# Patient Record
Sex: FEMALE | Race: WHITE | NOT HISPANIC OR LATINO | Employment: PART TIME | URBAN - METROPOLITAN AREA
[De-identification: names, ages, dates, MRNs, and addresses within clinical notes are randomized per-mention and may not be internally consistent; named-entity substitution may affect disease eponyms.]

---

## 2017-07-13 ENCOUNTER — THERAPY VISIT (OUTPATIENT)
Dept: PHYSICAL THERAPY | Facility: CLINIC | Age: 52
End: 2017-07-13
Payer: COMMERCIAL

## 2017-07-13 DIAGNOSIS — M25.561 RIGHT KNEE PAIN, UNSPECIFIED CHRONICITY: Primary | ICD-10-CM

## 2017-07-13 DIAGNOSIS — M25.551 HIP PAIN, RIGHT: ICD-10-CM

## 2017-07-13 PROCEDURE — 97110 THERAPEUTIC EXERCISES: CPT | Mod: GP | Performed by: PHYSICAL THERAPIST

## 2017-07-13 PROCEDURE — 97161 PT EVAL LOW COMPLEX 20 MIN: CPT | Mod: GP | Performed by: PHYSICAL THERAPIST

## 2017-07-13 ASSESSMENT — ACTIVITIES OF DAILY LIVING (ADL)
HOS_ADL_HIGHEST_POTENTIAL_SCORE: 68
KNEEL ON THE FRONT OF YOUR KNEE: ACTIVITY IS NOT DIFFICULT
RISE FROM A CHAIR: ACTIVITY IS NOT DIFFICULT
STAND: ACTIVITY IS NOT DIFFICULT
HOS_ADL_COUNT: 17
ROLLING_OVER_IN_BED: NO DIFFICULTY AT ALL
PUTTING_ON_SOCKS_AND_SHOES: NO DIFFICULTY AT ALL
TWISTING/PIVOTING_ON_INVOLVED_LEG: NO DIFFICULTY AT ALL
DEEP_SQUATTING: SLIGHT DIFFICULTY
WALK: ACTIVITY IS NOT DIFFICULT
STEPPING_UP_AND_DOWN_CURBS: NO DIFFICULTY AT ALL
KNEE_ACTIVITY_OF_DAILY_LIVING_SCORE: 84.29
HOW_WOULD_YOU_RATE_THE_CURRENT_FUNCTION_OF_YOUR_KNEE_DURING_YOUR_USUAL_DAILY_ACTIVITIES_ON_A_SCALE_FROM_0_TO_100_WITH_100_BEING_YOUR_LEVEL_OF_KNEE_FUNCTION_PRIOR_TO_YOUR_INJURY_AND_0_BEING_THE_INABILITY_TO_PERFORM_ANY_OF_YOUR_USUAL_DAILY_ACTIVITIES?: 85
WALKING_INITIALLY: NO DIFFICULTY AT ALL
STIFFNESS: THE SYMPTOM AFFECTS MY ACTIVITY SLIGHTLY
HEAVY_WORK: NO DIFFICULTY AT ALL
PAIN: THE SYMPTOM AFFECTS MY ACTIVITY MODERATELY
HOS_ADL_SCORE(%): 92.65
AS_A_RESULT_OF_YOUR_KNEE_INJURY,_HOW_WOULD_YOU_RATE_YOUR_CURRENT_LEVEL_OF_DAILY_ACTIVITY?: NEARLY NORMAL
STANDING_FOR_15_MINUTES: NO DIFFICULTY AT ALL
SITTING_FOR_15_MINUTES: MODERATE DIFFICULTY
LIGHT_TO_MODERATE_WORK: NO DIFFICULTY AT ALL
WALKING_15_MINUTES_OR_GREATER: NO DIFFICULTY AT ALL
WALKING_DOWN_STEEP_HILLS: SLIGHT DIFFICULTY
LIMPING: I DO NOT HAVE THE SYMPTOM
HOS_ADL_ITEM_SCORE_TOTAL: 63
SQUAT: ACTIVITY IS MINIMALLY DIFFICULT
SWELLING: THE SYMPTOM AFFECTS MY ACTIVITY MODERATELY
SIT WITH YOUR KNEE BENT: ACTIVITY IS NOT DIFFICULT
RAW_SCORE: 59
WEAKNESS: THE SYMPTOM AFFECTS MY ACTIVITY SLIGHTLY
HOW_WOULD_YOU_RATE_THE_OVERALL_FUNCTION_OF_YOUR_KNEE_DURING_YOUR_USUAL_DAILY_ACTIVITIES?: NEARLY NORMAL
WALKING_UP_STEEP_HILLS: SLIGHT DIFFICULTY
GO UP STAIRS: ACTIVITY IS NOT DIFFICULT
GETTING_INTO_AND_OUT_OF_AN_AVERAGE_CAR: SLIGHT DIFFICULTY
RECREATIONAL_ACTIVITIES: SLIGHT DIFFICULTY
GETTING_INTO_AND_OUT_OF_A_BATHTUB: NO DIFFICULTY AT ALL
KNEE_ACTIVITY_OF_DAILY_LIVING_SUM: 59
HOW_WOULD_YOU_RATE_YOUR_CURRENT_LEVEL_OF_FUNCTION_DURING_YOUR_USUAL_ACTIVITIES_OF_DAILY_LIVING_FROM_0_TO_100_WITH_100_BEING_YOUR_LEVEL_OF_FUNCTION_PRIOR_TO_YOUR_HIP_PROBLEM_AND_0_BEING_THE_INABILITY_TO_PERFORM_ANY_OF_YOUR_USUAL_DAILY_ACTIVITIES?: 80
GO DOWN STAIRS: ACTIVITY IS NOT DIFFICULT
GOING_DOWN_1_FLIGHT_OF_STAIRS: NO DIFFICULTY AT ALL
GOING_UP_1_FLIGHT_OF_STAIRS: NO DIFFICULTY AT ALL
GIVING WAY, BUCKLING OR SHIFTING OF KNEE: I DO NOT HAVE THE SYMPTOM
WALKING_APPROXIMATELY_10_MINUTES: NO DIFFICULTY AT ALL

## 2017-07-13 NOTE — MR AVS SNAPSHOT
"              After Visit Summary   7/13/2017    Nori Lazaro    MRN: [id number]           Patient Information     Date Of Birth          1965        Visit Information        Provider Department      7/13/2017 4:40 PM Aniya Saavedra PT [facility]        Today's Diagnoses     Right knee pain, unspecified chronicity    -  1    Hip pain, right           Follow-ups after your visit        Your next 10 appointments already scheduled     Jul 20, 2017 12:40 PM CDT   RANI Extremity with Aniya Saavedra PT   [facility] (R[facility] Nettie  )    [address]  [facility] [address] [phone]              Who to contact     If you have questions or need follow up information about today's clinic visit or your schedule please contact [facility] directly at [phone].  Normal or non-critical lab and imaging results will be communicated to you by [employer], letter or phone within 4 business days after the clinic has received the results. If you do not hear from us within 7 days, please contact the clinic through [employer] or phone. If you have a critical or abnormal lab result, we will notify you by phone as soon as possible.  Submit refill requests through [employer] or call your pharmacy and they will forward the refill request to us. Please allow 3 business days for your refill to be completed.          Additional Information About Your Visit        [employer] Information     [employer] lets you send messages to your doctor, view your test results, renew your prescriptions, schedule appointments and more. To sign up, go to [email or web address] . Click on \"Log in\" on the left side of the screen, which will take you to the Welcome page. Then click on \"Sign up Now\" on the right side of the page.     You will be asked to enter the access code listed below, as well as some " personal information. Please follow the directions to create your username and password.     Your access code is: [email or web address]  Expires: 10/11/2017  5:24 PM     Your access code will  in 90 days. If you need help or a new code, please call your [place] clinic or [phone].        Care EveryWhere ID     This is your Care EveryWhere ID. This could be used by other organizations to access your [place] medical records  [id number]         Blood Pressure from Last 3 Encounters:   14 114/76   14 120/78   12 128/86    Weight from Last 3 Encounters:   14 62.6 kg (138 lb)   12 61.7 kg (136 lb)   12 61.7 kg (136 lb)              We Performed the Following     HC PT EVAL, LOW COMPLEXITY     RANI INITIAL EVAL REPORT     THERAPEUTIC EXERCISES        Primary Care Provider Office Phone # Fax #    Angela [facility] [phone]       [facility] [address] [facility] [address]  [facility] [address]        Equal Access to Services     [facility]NA [facility] : Hadii aad ku hadasho Somarilee, waaxda luqadaha, qaybta kaalmada barrett, sammy joyce . So [facility] [phone].    ATENCIÓN: Si habla español, tiene a uriarte disposición servicios gratuitos de asistencia lingüística. [facility] [phone].    We comply with applicable federal civil rights laws and [place] laws. We do not discriminate on the basis of race, color, national origin, age, disability sex, sexual orientation or gender identity.            Thank you!     Thank you for choosing [facility]  for your care. Our goal is always to provide you with excellent care. Hearing back from our patients is one way we can continue to improve our services. Please take a few minutes to complete the written survey that you may receive in the mail after your visit with us. Thank you!             Your Updated Medication List - Protect others around you: Learn  how to safely use, store and throw away your medicines at www.disposemymeds.org.          This list is accurate as of: 7/13/17  5:24 PM.  Always use your most recent med list.                   Brand Name Dispense Instructions for use Diagnosis    ibuprofen 600 MG tablet    ADVIL/MOTRIN    30 tablet    Take 1 tablet (600 mg) by mouth every 6 hours as needed for pain (mild)    Acute post-operative pain       NONFORMULARY      Multiple supplements

## 2017-07-13 NOTE — PROGRESS NOTES
Subjective:    Patient is a 52 year old female presenting with rehab left ankle/foot hpi.                                      Pertinent medical history includes:  Menopausal and cancer.  Medical allergies: no.  Other surgeries include:  Cancer surgery and other.  Current medications:  None as reported by patient.  Current occupation is  teacher .  Patient is working in normal job without restrictions.  Primary job tasks include:  Prolonged sitting.    Barriers include:  None as reported by patient.    Red flags:  Severe dizziness and pain at rest/night.           Knee Activity of Daily Living Score: 84.29            Objective:    System    Physical Exam    General     ROS    Assessment/Plan:

## 2017-07-13 NOTE — PROGRESS NOTES
Subjective:  Physical Therapy Initial Evaluation   17   Precautions/Restrictions/MD instructions: PT eval and treat   Therapist Impression:   Pt is a 53 y/o female, with 8 month history of right knee and right hip/buttock pain (R leg pain), but signs and symptoms consistent with R sided lumbar radiculopathy. Pt presents with pain, decreased ROM/mobility, and decreased strength. These impairments limit their ability to sitting prolonged periods of time (>10 min). Skilled PT services necessary in order to reduce impairments and improve independent function.    Subjective:   Chief Complaint: Right knee and right hip/buttock pain. Worse with sitting prolonged periods of time.   DOI/onset: 10/15/16 DOS: none  Location: R buttock/hip/knee  Quality: sharp/shooting  Frequency: intermittent Radiates: intermittent down past R knee  Pain scale: Rest 5/10 Activity 10    Sleepin-2X/night wakes due to pain    Exacerbated by: sitting  Relieved by: chiro/massage  Progression: worse   Previous Treatment: chiro Effect of prior treatment: good   PMH and/or surgical history: L hamstring reconstruction; DNC; basal skin CA   Imaging: none    Occupation: /teacher  Job duties: sitting/standing    Current HEP/exercise regimen: running; biking and swimming  Patient's goals: get back to running and sit without pain  Medications: none to note  General health as reported by patient: good   Return to MD: as needed.   Red Flags: basel cell CA     HPI                    Objective:  HIP:    Lumbar Screen: extension and flexion full (painful with flexion and better with extension)  ++ slump on right     PROM: (* indicates patient's pain)   L  R   Flexion 120 120   Extension 20 20   Abduction 45 45   IR (supine 90-90) 45 45   ER (supine 90-90) 50 50     Strength:   L R   HIP     Flex 4/5 4/5   Ext 4/5 4/5   Add (0-45-90 degrees: supine) nt  nt   Abd 4-/5 3+/5     Special tests:   L R   JASON - +   SLR - +   Log Roll - -        Palpation: tender to low back - PA's to L4-5      System    Physical Exam    General     ROS    Assessment/Plan:      Patient is a 52 year old female with right side hip and right side knee complaints.    Patient has the following significant findings with corresponding treatment plan.                Diagnosis 1:  Right knee pain, and right hip pain  Pain -  hot/cold therapy, manual therapy, self management, education, directional preference exercise and home program  Decreased ROM/flexibility - manual therapy and therapeutic exercise  Decreased joint mobility - manual therapy and therapeutic exercise  Decreased strength - therapeutic exercise and therapeutic activities  Impaired balance - neuro re-education and therapeutic activities  Decreased proprioception - neuro re-education and therapeutic activities  Inflammation - cold therapy and self management/home program  Impaired gait - gait training  Impaired muscle performance - neuro re-education  Decreased function - therapeutic activities    Therapy Evaluation Codes:   1) History comprised of:   Personal factors that impact the plan of care:      None.    Comorbidity factors that impact the plan of care are:      None.     Medications impacting care: None.  2) Examination of Body Systems comprised of:   Body structures and functions that impact the plan of care:      Hip and Knee.   Activity limitations that impact the plan of care are:      Sitting.  3) Clinical presentation characteristics are:   Stable/Uncomplicated.  4) Decision-Making    Low complexity using standardized patient assessment instrument and/or measureable assessment of functional outcome.  Cumulative Therapy Evaluation is: Low complexity.    Previous and current functional limitations:  (See Goal Flow Sheet for this information)    Short term and Long term goals: (See Goal Flow Sheet for this information)     Communication ability:  Patient appears to be able to clearly communicate and  understand verbal and written communication and follow directions correctly.  Treatment Explanation - The following has been discussed with the patient:   RX ordered/plan of care  Anticipated outcomes  Possible risks and side effects  This patient would benefit from PT intervention to resume normal activities.   Rehab potential is excellent.    Frequency:  1 X week, once daily  Duration:  for 8 weeks  Discharge Plan:  Achieve all LTG.  Independent in home treatment program.  Reach maximal therapeutic benefit.      Please refer to the daily flowsheet for treatment today, total treatment time and time spent performing 1:1 timed codes.

## 2017-07-13 NOTE — LETTER
[facility]  [address]  [place] [address]  [phone]    2017    Re: Nori Lazaro   :   1965  MRN:  [id number]   REFERRING PHYSICIAN:   Kerline Raphael     [facility]  Date of Initial Evaluation:  2017  Visits:  1 Rxs Used: 1  Reason for Referral:     Right knee pain, unspecified chronicity  Hip pain, right  EVALUATION SUMMARY  Subjective:  Physical Therapy Initial Evaluation   17   Precautions/Restrictions/MD instructions: PT eval and treat   Therapist Impression:   Pt is a 51 y/o female, with 8 month history of right knee and right hip/buttock pain (R leg pain), but signs and symptoms consistent with R sided lumbar radiculopathy. Pt presents with pain, decreased ROM/mobility, and decreased strength. These impairments limit their ability to sitting prolonged periods of time (>10 min). Skilled PT services necessary in order to reduce impairments and improve independent function.  Subjective:   Chief Complaint: Right knee and right hip/buttock pain. Worse with sitting prolonged periods of time.   DOI/onset: 10/15/16 DOS: none  Location: R buttock/hip/knee  Quality: sharp/shooting  Frequency: intermittent Radiates: intermittent down past R knee  Pain scale: Rest 5/10 Activity 6/10    Sleepin-2X/night wakes due to pain    Exacerbated by: sitting  Relieved by: chiro/massage  Progression: worse   Previous Treatment: chiro Effect of prior treatment: good   PMH and/or surgical history: L hamstring reconstruction; DNC; basal skin CA   Imaging: none    Occupation: /teacher  Job duties: sitting/standing    Current HEP/exercise regimen: running; biking and swimming  Patient's goals: get   Re: Nori Lazaro   :   1965  back to running and sit without pain  Medications: none to note  General health as reported by patient: good   Return to MD: as  needed.   Red Flags: basel cell CA   Objective:  HIP:  Lumbar Screen: extension and flexion full (painful with flexion and better with extension)  ++ slump on right   PROM: (* indicates patient's pain)   L  R   Flexion 120 120   Extension 20 20   Abduction 45 45   IR (supine 90-90) 45 45   ER (supine 90-90) 50 50   Strength:   L R   HIP     Flex 4/5 4/5   Ext 4/5 4/5   Add (0-45-90 degrees: supine) nt  nt   Abd 4-/5 3+/5   Special tests:   L R   JASON - +   SLR - +   Log Roll - -   Palpation: tender to low back - PA's to L4-5  Assessment/Plan:    Patient is a 52 year old female with right side hip and right side knee complaints.    Patient has the following significant findings with corresponding treatment plan.                Diagnosis 1:  Right knee pain, and right hip pain  Pain -  hot/cold therapy, manual therapy, self management, education, directional preference exercise and home program  Decreased ROM/flexibility - manual therapy and therapeutic exercise  Decreased joint mobility - manual therapy and therapeutic exercise  Decreased strength - therapeutic exercise and therapeutic activities  Impaired balance - neuro re-education and therapeutic activities  Decreased proprioception - neuro re-education and therapeutic activities  Inflammation - cold therapy and self management/home program  Impaired gait - gait training  Impaired muscle performance - neuro re-education  Decreased function - therapeutic activities  Therapy Evaluation Codes:   1) History comprised of:  Re: Nori Lazaro   :   1965   Personal factors that impact the plan of care:      None.    Comorbidity factors that impact the plan of care are:      None.     Medications impacting care: None.  2) Examination of Body Systems comprised of:   Body structures and functions that impact the plan of care:      Hip and Knee.   Activity limitations that impact the plan of care are:      Sitting.  3) Clinical presentation characteristics  are:   Stable/Uncomplicated.  4) Decision-Making    Low complexity using standardized patient assessment instrument and/or measureable assessment of functional outcome.  Cumulative Therapy Evaluation is: Low complexity.  Previous and current functional limitations:  (See Goal Flow Sheet for this information)    Short term and Long term goals: (See Goal Flow Sheet for this information)   Communication ability:  Patient appears to be able to clearly communicate and understand verbal and written communication and follow directions correctly.  Treatment Explanation - The following has been discussed with the patient:   RX ordered/plan of care  Anticipated outcomes  Possible risks and side effects  This patient would benefit from PT intervention to resume normal activities.   Rehab potential is excellent.  Frequency:  1 X week, once daily  Duration:  for 8 weeks  Discharge Plan:  Achieve all LTG.  Independent in home treatment program.  Reach maximal therapeutic benefit.    Thank you for your referral.    INQUIRIES  Therapist: Aniya Saavedra DPT  [facility]  [address] [place], [place] [address] [address]  Phone: [phone]  Fax: [phone]

## 2017-07-20 ENCOUNTER — THERAPY VISIT (OUTPATIENT)
Dept: PHYSICAL THERAPY | Facility: CLINIC | Age: 52
End: 2017-07-20
Payer: COMMERCIAL

## 2017-07-20 DIAGNOSIS — M25.561 RIGHT KNEE PAIN, UNSPECIFIED CHRONICITY: ICD-10-CM

## 2017-07-20 DIAGNOSIS — M25.551 HIP PAIN, RIGHT: ICD-10-CM

## 2017-07-20 PROCEDURE — 97112 NEUROMUSCULAR REEDUCATION: CPT | Mod: GP | Performed by: PHYSICAL THERAPIST

## 2017-07-20 PROCEDURE — 97140 MANUAL THERAPY 1/> REGIONS: CPT | Mod: GP | Performed by: PHYSICAL THERAPIST

## 2017-07-31 ENCOUNTER — THERAPY VISIT (OUTPATIENT)
Dept: PHYSICAL THERAPY | Facility: CLINIC | Age: 52
End: 2017-07-31
Payer: COMMERCIAL

## 2017-07-31 DIAGNOSIS — M25.551 HIP PAIN, RIGHT: ICD-10-CM

## 2017-07-31 DIAGNOSIS — M25.561 RIGHT KNEE PAIN, UNSPECIFIED CHRONICITY: ICD-10-CM

## 2017-07-31 PROCEDURE — 97112 NEUROMUSCULAR REEDUCATION: CPT | Mod: GP | Performed by: PHYSICAL THERAPIST

## 2017-07-31 PROCEDURE — 97140 MANUAL THERAPY 1/> REGIONS: CPT | Mod: GP | Performed by: PHYSICAL THERAPIST

## 2017-08-14 ENCOUNTER — THERAPY VISIT (OUTPATIENT)
Dept: PHYSICAL THERAPY | Facility: CLINIC | Age: 52
End: 2017-08-14
Payer: COMMERCIAL

## 2017-08-14 DIAGNOSIS — M25.561 RIGHT KNEE PAIN, UNSPECIFIED CHRONICITY: ICD-10-CM

## 2017-08-14 DIAGNOSIS — M25.551 HIP PAIN, RIGHT: ICD-10-CM

## 2017-08-14 PROCEDURE — 97140 MANUAL THERAPY 1/> REGIONS: CPT | Mod: GP | Performed by: PHYSICAL THERAPIST

## 2017-08-14 PROCEDURE — 97110 THERAPEUTIC EXERCISES: CPT | Mod: GP | Performed by: PHYSICAL THERAPIST

## 2017-08-31 ENCOUNTER — THERAPY VISIT (OUTPATIENT)
Dept: PHYSICAL THERAPY | Facility: CLINIC | Age: 52
End: 2017-08-31
Payer: COMMERCIAL

## 2017-08-31 DIAGNOSIS — M25.551 HIP PAIN, RIGHT: ICD-10-CM

## 2017-08-31 DIAGNOSIS — M25.561 RIGHT KNEE PAIN, UNSPECIFIED CHRONICITY: ICD-10-CM

## 2017-08-31 PROCEDURE — 97110 THERAPEUTIC EXERCISES: CPT | Mod: GP | Performed by: PHYSICAL THERAPIST

## 2017-08-31 PROCEDURE — 97140 MANUAL THERAPY 1/> REGIONS: CPT | Mod: GP | Performed by: PHYSICAL THERAPIST

## 2017-09-15 ENCOUNTER — THERAPY VISIT (OUTPATIENT)
Dept: PHYSICAL THERAPY | Facility: CLINIC | Age: 52
End: 2017-09-15
Payer: COMMERCIAL

## 2017-09-15 DIAGNOSIS — M25.561 RIGHT KNEE PAIN, UNSPECIFIED CHRONICITY: ICD-10-CM

## 2017-09-15 DIAGNOSIS — M25.551 HIP PAIN, RIGHT: ICD-10-CM

## 2017-09-15 PROCEDURE — 97140 MANUAL THERAPY 1/> REGIONS: CPT | Mod: GP | Performed by: PHYSICAL THERAPIST

## 2017-09-15 PROCEDURE — 97112 NEUROMUSCULAR REEDUCATION: CPT | Mod: GP | Performed by: PHYSICAL THERAPIST

## 2017-09-15 PROCEDURE — 97110 THERAPEUTIC EXERCISES: CPT | Mod: GP | Performed by: PHYSICAL THERAPIST

## 2018-03-23 ENCOUNTER — TRANSFERRED RECORDS (OUTPATIENT)
Dept: HEALTH INFORMATION MANAGEMENT | Facility: CLINIC | Age: 53
End: 2018-03-23

## 2018-04-17 ENCOUNTER — TRANSFERRED RECORDS (OUTPATIENT)
Dept: HEALTH INFORMATION MANAGEMENT | Facility: CLINIC | Age: 53
End: 2018-04-17

## 2018-04-25 NOTE — TELEPHONE ENCOUNTER
FUTURE VISIT INFORMATION      FUTURE VISIT INFORMATION:    Date: 4/30/18    Time: 0800    Location: ORTHO  REFERRAL INFORMATION:    Referring provider:  DR MINOR    Referring providers clinic:  [facility]    Reason for visit/diagnosis  L KNEE MASS    RECORDS REQUESTED FROM:       Clinic name Comments Records Status Imaging Status   [facility] REQUEST SENT 4/27/18     [facility] IMAGES RECEIVED   RECEIVED 4/27/18                             RECORDS STATUS

## 2018-04-29 ASSESSMENT — ENCOUNTER SYMPTOMS
STIFFNESS: 1
MEMORY LOSS: 0
PARALYSIS: 0
MUSCLE WEAKNESS: 0
NECK PAIN: 0
LOSS OF CONSCIOUSNESS: 0
HEADACHES: 0
MUSCLE CRAMPS: 1
NUMBNESS: 1
ARTHRALGIAS: 1
TINGLING: 1
WEAKNESS: 1
DIZZINESS: 0
BACK PAIN: 0
SEIZURES: 0
SPEECH CHANGE: 0
MYALGIAS: 1
DISTURBANCES IN COORDINATION: 0
JOINT SWELLING: 1
TREMORS: 0

## 2018-04-30 ENCOUNTER — OFFICE VISIT (OUTPATIENT)
Dept: ORTHOPEDICS | Facility: CLINIC | Age: 53
End: 2018-04-30
Payer: COMMERCIAL

## 2018-04-30 ENCOUNTER — PRE VISIT (OUTPATIENT)
Dept: ORTHOPEDICS | Facility: CLINIC | Age: 53
End: 2018-04-30

## 2018-04-30 VITALS — BODY MASS INDEX: 23.64 KG/M2 | WEIGHT: 141.9 LBS | HEIGHT: 65 IN

## 2018-04-30 DIAGNOSIS — M25.562 ARTHRALGIA OF LEFT LOWER LEG: ICD-10-CM

## 2018-04-30 DIAGNOSIS — M25.562 ARTHRALGIA OF LEFT LOWER LEG: Primary | ICD-10-CM

## 2018-04-30 LAB
APPEARANCE FLD: NORMAL
COLOR FLD: YELLOW
CRYSTALS SNV MICRO: NORMAL
ERYTHROCYTE [DISTWIDTH] IN BLOOD BY AUTOMATED COUNT: 12.8 % (ref 10–15)
ERYTHROCYTE [SEDIMENTATION RATE] IN BLOOD BY WESTERGREN METHOD: 7 MM/H (ref 0–30)
HCT VFR BLD AUTO: 46.5 % (ref 35–47)
HGB BLD-MCNC: 15.6 G/DL (ref 11.7–15.7)
LYMPHOCYTES NFR FLD MANUAL: 4 %
MCH RBC QN AUTO: 30.2 PG (ref 26.5–33)
MCHC RBC AUTO-ENTMCNC: 33.5 G/DL (ref 31.5–36.5)
MCV RBC AUTO: 90 FL (ref 78–100)
MONOS+MACROS NFR FLD MANUAL: 96 %
PLATELET # BLD AUTO: 206 10E9/L (ref 150–450)
RBC # BLD AUTO: 5.16 10E12/L (ref 3.8–5.2)
RHEUMATOID FACT SER NEPH-ACNC: <20 IU/ML (ref 0–20)
SPECIMEN SOURCE FLD: NORMAL
WBC # BLD AUTO: 4 10E9/L (ref 4–11)
WBC # FLD AUTO: 143 /UL

## 2018-04-30 NOTE — NURSING NOTE
[facility]  [address] [place] [address]  Dept: [phone]  ______________________________________________________________________________    Patient: Nori Lazaro   : 1965   MRN: [id number]   2018    INVASIVE PROCEDURE SAFETY CHECKLIST    Date: 2018   Procedure: Left Knee Aspiration  Patient Name: Nori Lazaro  MRN: [id number]  YOB: 1965    Action: Complete sections as appropriate. Any discrepancy results in a HARD COPY until resolved.     PRE PROCEDURE:  Patient ID verified with 2 identifiers (name and  or MRN): Yes  Procedure and site verified with patient/designee (when able): Yes  Accurate consent documentation in medical record: Yes  H&P (or appropriate assessment) documented in medical record: Yes  H&P must be up to 20 days prior to procedure and updates within 24 hours of procedure as applicable: Yes  Relevant diagnostic and radiology test results appropriately labeled and displayed as applicable: Yes  Procedure site(s) marked with provider initials: Yes    TIMEOUT:  Time-Out performed immediately prior to starting procedure, including verbal and active participation of all team members addressing the following:Yes  * Correct patient identify  * Confirmed that the correct side and site are marked  * An accurate procedure consent form  * Agreement on the procedure to be done  * Correct patient position  * Relevant images and results are properly labeled and appropriately displayed  * The need to administer antibiotics or fluids for irrigation purposes during the procedure as applicable   * Safety precautions based on patient history or medication use    DURING PROCEDURE: Verification of correct person, site, and procedures any time the responsibility for care of the patient is transferred to another member of the care team.

## 2018-04-30 NOTE — MR AVS SNAPSHOT
"              After Visit Summary   4/30/2018    Nori Lazaro    MRN: [id number]           Patient Information     Date Of Birth          1965        Visit Information        Provider Department      4/30/2018 8:00 AM Leo Bar MD [facility]        Today's Diagnoses     Arthralgia of left lower leg    -  1       Follow-ups after your visit        Who to contact     Please call your clinic at [phone] to:    Ask questions about your health    Make or cancel appointments    Discuss your medicines    Learn about your test results    Speak to your doctor            Additional Information About Your Visit        MyChart Information     [employer] gives you secure access to your electronic health record. If you see a primary care provider, you can also send messages to your care team and make appointments. If you have questions, please call your primary care clinic.  If you do not have a primary care provider, please call [phone] and they will assist you.      [employer] is an electronic gateway that provides easy, online access to your medical records. With [employer], you can request a clinic appointment, read your test results, renew a prescription or communicate with your care team.     To access your existing account, please contact your [facility] or call [phone] for assistance.        Care EveryWhere ID     This is your Care EveryWhere ID. This could be used by other organizations to access your [place] medical records  [id number]        Your Vitals Were     Height BMI (Body Mass Index)                1.651 m (5' 5\") 23.61 kg/m2           Blood Pressure from Last 3 Encounters:   08/13/14 114/76   06/03/14 120/78   06/27/12 128/86    Weight from Last 3 Encounters:   04/30/18 64.4 kg (141 lb 14.4 oz)   08/13/14 62.6 kg (138 lb)   06/27/12 61.7 kg (136 lb)              We Performed the Following     Cell count with differential fluid [[id number]]     " Crystal ID synovial fluid [Ecl1150]     Large Joint/Bursa injection and/or drainage - Unilateral (Shoulder, Knee) [20610]        Primary Care Provider Office Phone # Fax #    Liliana Colindres [phone]       [facility] [address] [facility] [address]  [facility] [address]        Equal Access to Services     [facility] : Hadii aad ku hadasho Soomaali, waaxda luqadaha, qaybta kaalmada adeegyada, waxay idiin hayaan adeeg kharash la'aan . So [facility] [phone].    ATENCIÓN: Si habla español, tiene a uriarte disposición servicios gratuitos de asistencia lingüística. Isabella al [phone].    We comply with applicable federal civil rights laws and [place] laws. We do not discriminate on the basis of race, color, national origin, age, disability, sex, sexual orientation, or gender identity.            Thank you!     Thank you for choosing [facility]OP[facility]ED[facility] CLINIC  for your care. Our goal is always to provide you with excellent care. Hearing back from our patients is one way we can continue to improve our services. Please take a few minutes to complete the written survey that you may receive in the mail after your visit with us. Thank you!             Your Updated Medication List - Protect others around you: Learn how to safely use, store and throw away your medicines at www.disposemymeds.org.          This list is accurate as of 4/30/18 10:24 AM.  Always use your most recent med list.                   Brand Name Dispense Instructions for use Diagnosis    ALGAL OMEGA-3  MG capsule   Generic drug:  Docosahexaenoic Acid           Calcium-Magnesium 200-50 MG Tabs           cholecalciferol 1000 UNIT tablet    vitamin D3          Glucosamine Sulfate 750 MG Tabs           METHYL B-12 1000 MCG Lozg   Generic drug:  Methylcobalamin

## 2018-04-30 NOTE — PROGRESS NOTES
[facility], Orthopaedic Oncology Surgery Consultation  by Leo Bar M.D.    Nori Lazaro MRN# [id number]   Age: 52 year old YOB: 1965     Requesting physician: Angela Mccoy            Assessment and Plan:   Assessment:  51 yo f presenting for evaluation of left knee mass/pain.        Plan:  Aspiration performed in clinic for evaluation.  Suspect PVNS vs RA vs other.  If inflammatory markers and aspiration testing do not yield a diagnosis, consider arthroscopic biopsy and exam of L knee joint.      PROCEDURE DATE: 4/30/2018    PROCEDURE NOTE:    After obtaining informed consent, under sterile precautions, the left knee was aspirated.  Topical 1% lidocaine was used as necessary.  There were no complications.  1 ml of straw colored fluid was obtained and sent for analysis.                 History of Present Illness:   52 year old female w/ bilateral knee pain of 11 mo duration.  She notes it started bilaterally with posterior knee pain, which has progressively worsend over the course of 11 mo.  She describes a moderate aching pain, now posteriorly and medially, mostly of the left knee.  She notes it is related to increases in activity.  She notes swelling intermittently.  She has tried PT and activity modification without help.  She states her symptoms are acutely worse over the last 3 weeks after an attempt to just jog.  She presents for evaluation.     Current symptoms:  Problem: B knee pain, L greater than right  Onset and duration: insidious with multiple small injuries along the 11 mo course  Awakens from sleep due to sx's:  No  Precipitating Injury:  No    Other joints or sites painful:  No  Fever: No  Appetite change or weight loss: No    Background history:  DX:  1. none    TREATMENTS:  1. PT for core stabilization, hamstring stretching             Physical Exam:     EXAMINATION pertinent findings:   VITAL SIGNS: There were no vitals taken  for this visit.  There is no height or weight on file to calculate BMI.  RESP: non labored breathing   ABD: benign   SKIN: grossly normal   LYMPHATIC: grossly normal   NEURO: grossly normal, neg SLR, neg fem nerve stretch test  VASCULAR: satisfactory perfusion of all extremities   MUSCULOSKELETAL:   0-135 rom to bilateral knees, painful to deep flexion to the left only  Mild ttp to medial joint line to the L  Neg mcmurries, stable to varus/valg/lachman  Painless rom B hips, neg imp, neg stinchfield                  Data:   All laboratory data reviewed  All imaging studies reviewed by me    MRI L knee reviewed - synovitic process involving medially, and posteriorly, degenerative changes noted throughout      Signed:   Jorden Ness MD  Adult Reconstructive Surgery Fellow  Dept Orthopaedic Surgery, [facility]      Attending MD (Dr. Leo Bar) Attestation :  This patient was seen and evaluated by me including a history, exam, and interpretation of all imaging and/or lab data.  Either a training physician (resident/fellow), who also saw the patient, or scribe has documented the clinic visit in the attached note.    MD Hernesto Harrison Family Professor  Oncology and Adult Reconstructive Surgery  Dept Orthopaedic Surgery, [facility]  [phone] office, [phone] pager  [email or web address]            DATA for DOCUMENTATION:         Past Medical History:     Patient Active Problem List   Diagnosis     Major depressive disorder, single episode, mild (H)     Endometrial polyp     Right knee pain, unspecified chronicity     Hip pain, right     History of basal cell carcinoma     History of dysplastic nevus     Left knee pain, unspecified chronicity     Past Medical History:   Diagnosis Date     Endometrial polyp      Major depressive disorder, single episode, mild (H)      Other and unspecified hyperlipidemia      Skin cancer        Also see scanned health assessment forms.       Past Surgical  History:     Past Surgical History:   Procedure Laterality Date     BIOPSY OF SKIN LESION       C PELVIS/HIP JOINT SURGERY UNLISTED  6/2012    hamstring tear /reattachment     DILATION AND CURETTAGE, HYSTEROSCOPY DIAGNOSTIC, COMBINED  8/13/2014    Procedure: COMBINED DILATION AND CURETTAGE, HYSTEROSCOPY DIAGNOSTIC;  Surgeon: Liliana Luciano MD;  Location: [facility]            Social History:     Social History     Social History     Marital status:      Spouse name: N/A     Number of children: N/A     Years of education: N/A     Occupational History     Not on file.     Social History Main Topics     Smoking status: Never Smoker     Smokeless tobacco: Never Used     Alcohol use Yes      Comment: on average 1 glass of wine or 1 beer a week     Drug use: No     Sexual activity: Yes     Partners: Male     Birth control/ protection: Post-menopausal     Other Topics Concern     Not on file     Social History Narrative            Family History:       Family History   Problem Relation Age of Onset     Hypertension Father      Arthritis Father      Alcoholism Father      DIABETES Paternal Grandmother      Arthritis Paternal Grandmother      HEART DISEASE Paternal Grandmother      Lipids Paternal Grandmother      Coronary Artery Disease Paternal Grandmother      Breast Cancer Mother      Alcohol/Drug Mother      Anesthesia Reaction Mother      Neurologic Disorder Mother      Lipids Mother      OSTEOPOROSIS Mother      Migraines Mother      Alcoholism Mother      Arthritis Maternal Grandmother      Eye Disorder Maternal Grandmother      HEART DISEASE Maternal Grandmother      OSTEOPOROSIS Maternal Grandmother      Depression Sister      Depression Maternal Grandfather      MENTAL ILLNESS Maternal Grandfather      HEART DISEASE Paternal Grandfather      Psychotic Disorder Paternal Grandfather      Coronary Artery Disease Paternal Grandfather      Other Cancer Paternal Grandfather      lung     Psychotic Disorder  Sister      CANCER Other      lung cancer - paternal grandfather (smoker)     MENTAL ILLNESS Sister             Medications:     Current Outpatient Prescriptions   Medication Sig     Calcium-Magnesium 200-50 MG TABS      cholecalciferol (VITAMIN D3) 1000 UNIT tablet      Docosahexaenoic Acid (ALGAL OMEGA-3 DHA) 200 MG capsule      Glucosamine Sulfate 750 MG TABS      Methylcobalamin (METHYL B-12) 1000 MCG LOZG      No current facility-administered medications for this visit.               Review of Systems:   A comprehensive 10 point review of systems (constitutional, ENT, cardiac, peripheral vascular, lymphatic, respiratory, GI, , Musculoskeletal, skin, Neurological) was performed and found to be negative except as described in this note.     See intake form completed by patient      Answers for HPI/ROS submitted by the patient on 4/29/2018   General Symptoms: No  Skin Symptoms: No  HENT Symptoms: No  EYE SYMPTOMS: No  HEART SYMPTOMS: No  LUNG SYMPTOMS: No  INTESTINAL SYMPTOMS: No  URINARY SYMPTOMS: No  GYNECOLOGIC SYMPTOMS: No  BREAST SYMPTOMS: No  SKELETAL SYMPTOMS: Yes  BLOOD SYMPTOMS: No  NERVOUS SYSTEM SYMPTOMS: Yes  MENTAL HEALTH SYMPTOMS: No  Back pain: No  Muscle aches: Yes  Neck pain: No  Swollen joints: Yes  Joint pain: Yes  Bone pain: No  Muscle cramps: Yes  Muscle weakness: No  Joint stiffness: Yes  Bone fracture: No  Trouble with coordination: No  Dizziness or trouble with balance: No  Fainting or black-out spells: No  Memory loss: No  Headache: No  Seizures: No  Speech problems: No  Tingling: Yes  Tremor: No  Weakness: Yes  Difficulty walking: No  Paralysis: No  Numbness: Yes

## 2018-04-30 NOTE — LETTER
4/30/2018       RE: Nori Lazaro  [address]  [place] [address]     Dear Colleague,    Thank you for referring your patient, Nori Lazaro, to the [facility]TH[facility] at [facility]. Please see a copy of my visit note below.        [facility], Orthopaedic Oncology Surgery Consultation  by Leo Bar M.D.    Nori Lazaro MRN# [id number]   Age: 52 year old YOB: 1965     Requesting physician: Angela Mccoy            Assessment and Plan:   Assessment:  53 yo f presenting for evaluation of left knee mass/pain.        Plan:  Aspiration performed in clinic for evaluation.  Suspect PVNS vs RA vs other.  If inflammatory markers and aspiration testing do not yield a diagnosis, consider arthroscopic biopsy and exam of L knee joint.      PROCEDURE DATE: 4/30/2018    PROCEDURE NOTE:    After obtaining informed consent, under sterile precautions, the left knee was aspirated.  Topical 1% lidocaine was used as necessary.  There were no complications.  1 ml of straw colored fluid was obtained and sent for analysis.                 History of Present Illness:   52 year old female w/ bilateral knee pain of 11 mo duration.  She notes it started bilaterally with posterior knee pain, which has progressively worsend over the course of 11 mo.  She describes a moderate aching pain, now posteriorly and medially, mostly of the left knee.  She notes it is related to increases in activity.  She notes swelling intermittently.  She has tried PT and activity modification without help.  She states her symptoms are acutely worse over the last 3 weeks after an attempt to just jog.  She presents for evaluation.     Current symptoms:  Problem: B knee pain, L greater than right  Onset and duration: insidious with multiple small injuries along the 11 mo course  Awakens from sleep due to sx's:  No  Precipitating Injury:  No     Other joints or sites painful:  No  Fever: No  Appetite change or weight loss: No    Background history:  DX:  1. none    TREATMENTS:  1. PT for core stabilization, hamstring stretching             Physical Exam:     EXAMINATION pertinent findings:   VITAL SIGNS: There were no vitals taken for this visit.  There is no height or weight on file to calculate BMI.  RESP: non labored breathing   ABD: benign   SKIN: grossly normal   LYMPHATIC: grossly normal   NEURO: grossly normal, neg SLR, neg fem nerve stretch test  VASCULAR: satisfactory perfusion of all extremities   MUSCULOSKELETAL:   0-135 rom to bilateral knees, painful to deep flexion to the left only  Mild ttp to medial joint line to the L  Neg mcmurries, stable to varus/valg/lachman  Painless rom B hips, neg imp, neg stinchfield                  Data:   All laboratory data reviewed  All imaging studies reviewed by me    MRI L knee reviewed - synovitic process involving medially, and posteriorly, degenerative changes noted throughout      Signed:   Jorden Ness MD  Adult Reconstructive Surgery Fellow  Dept Orthopaedic Surgery, [facility]      Attending MD (Dr. Leo Bar) Attestation :  This patient was seen and evaluated by me including a history, exam, and interpretation of all imaging and/or lab data.  Either a training physician (resident/fellow), who also saw the patient, or scribe has documented the clinic visit in the attached note.          DATA for DOCUMENTATION:         Past Medical History:     Patient Active Problem List   Diagnosis     Major depressive disorder, single episode, mild (H)     Endometrial polyp     Right knee pain, unspecified chronicity     Hip pain, right     History of basal cell carcinoma     History of dysplastic nevus     Left knee pain, unspecified chronicity     Past Medical History:   Diagnosis Date     Endometrial polyp      Major depressive disorder, single episode, mild (H)      Other and unspecified  hyperlipidemia      Skin cancer        Also see scanned health assessment forms.       Past Surgical History:     Past Surgical History:   Procedure Laterality Date     BIOPSY OF SKIN LESION       C PELVIS/HIP JOINT SURGERY UNLISTED  6/2012    hamstring tear /reattachment     DILATION AND CURETTAGE, HYSTEROSCOPY DIAGNOSTIC, COMBINED  8/13/2014    Procedure: COMBINED DILATION AND CURETTAGE, HYSTEROSCOPY DIAGNOSTIC;  Surgeon: Liliana Luciano MD;  Location: [facility]            Social History:     Social History     Social History     Marital status:      Spouse name: N/A     Number of children: N/A     Years of education: N/A     Occupational History     Not on file.     Social History Main Topics     Smoking status: Never Smoker     Smokeless tobacco: Never Used     Alcohol use Yes      Comment: on average 1 glass of wine or 1 beer a week     Drug use: No     Sexual activity: Yes     Partners: Male     Birth control/ protection: Post-menopausal     Other Topics Concern     Not on file     Social History Narrative            Family History:       Family History   Problem Relation Age of Onset     Hypertension Father      Arthritis Father      Alcoholism Father      DIABETES Paternal Grandmother      Arthritis Paternal Grandmother      HEART DISEASE Paternal Grandmother      Lipids Paternal Grandmother      Coronary Artery Disease Paternal Grandmother      Breast Cancer Mother      Alcohol/Drug Mother      Anesthesia Reaction Mother      Neurologic Disorder Mother      Lipids Mother      OSTEOPOROSIS Mother      Migraines Mother      Alcoholism Mother      Arthritis Maternal Grandmother      Eye Disorder Maternal Grandmother      HEART DISEASE Maternal Grandmother      OSTEOPOROSIS Maternal Grandmother      Depression Sister      Depression Maternal Grandfather      MENTAL ILLNESS Maternal Grandfather      HEART DISEASE Paternal Grandfather      Psychotic Disorder Paternal Grandfather      Coronary Artery  Disease Paternal Grandfather      Other Cancer Paternal Grandfather      lung     Psychotic Disorder Sister      CANCER Other      lung cancer - paternal grandfather (smoker)     MENTAL ILLNESS Sister             Medications:     Current Outpatient Prescriptions   Medication Sig     Calcium-Magnesium 200-50 MG TABS      cholecalciferol (VITAMIN D3) 1000 UNIT tablet      Docosahexaenoic Acid (ALGAL OMEGA-3 DHA) 200 MG capsule      Glucosamine Sulfate 750 MG TABS      Methylcobalamin (METHYL B-12) 1000 MCG LOZG      No current facility-administered medications for this visit.               Review of Systems:   A comprehensive 10 point review of systems (constitutional, ENT, cardiac, peripheral vascular, lymphatic, respiratory, GI, , Musculoskeletal, skin, Neurological) was performed and found to be negative except as described in this note.     See intake form completed by patient    Again, thank you for allowing me to participate in the care of your patient.      Sincerely,    Leo Bar MD

## 2018-04-30 NOTE — NURSING NOTE
"Chief Complaint   Patient presents with     Consult     Pt. states that she is here today for a Tumor of her Left Knee. Pt. states that she has been experiencing pain for 1yr (June 2017) while running. She states that she had done a boot camp class and the day after Thanksgiving she could barley walk. She states that she also slipped on ice around Feb. 14th 2018, but is unsure if these incidents are related to the Mass. Referring:  GARCIA MINOR       52 year old  1965    Ht 1.651 m (5' 5\")  Wt 64.4 kg (141 lb 14.4 oz)  BMI 23.61 kg/m2                     Pain Assessment  Patient Currently in Pain: Yes  Primary Pain Location: Knee  Pain Orientation: Left  Aggravating Factors:  (Running. She states that she began taking time off to let her Knee rest along with PT, but states that it hasn't helped. )                         [facility] [address] [address] [place] [address]    No Known Allergies  Current Outpatient Prescriptions   Medication     Calcium-Magnesium 200-50 MG TABS     cholecalciferol (VITAMIN D3) 1000 UNIT tablet     Docosahexaenoic Acid (ALGAL OMEGA-3 DHA) 200 MG capsule     Glucosamine Sulfate 750 MG TABS     Methylcobalamin (METHYL B-12) 1000 MCG LOZG     No current facility-administered medications for this visit.                  "

## 2018-05-01 LAB
ANA PAT SER IF-IMP: ABNORMAL
ANA SER QL IF: ABNORMAL
ANA TITR SER IF: ABNORMAL {TITER}

## 2018-05-05 ENCOUNTER — HEALTH MAINTENANCE LETTER (OUTPATIENT)
Age: 53
End: 2018-05-05

## 2018-05-07 DIAGNOSIS — M06.9 RA (RHEUMATOID ARTHRITIS) (H): Primary | ICD-10-CM

## 2018-05-07 DIAGNOSIS — M25.562 ACUTE PAIN OF LEFT KNEE: ICD-10-CM

## 2018-05-07 NOTE — PROGRESS NOTES
Nori, I reviewed your test results and and concerned about the possibility of an autoimmune connective tissue disease.  Therefore, I will ask our nurse Dana to arrange for rheumatologic evaluation.    Leo Bar MD  5/7/2018  5:07 PM

## 2018-05-15 ENCOUNTER — TELEPHONE (OUTPATIENT)
Dept: RHEUMATOLOGY | Facility: CLINIC | Age: 53
End: 2018-05-15

## 2018-05-15 NOTE — TELEPHONE ENCOUNTER
[facility] Call Center    Phone Message    May a detailed message be left on voicemail: yes    Reason for Call: Other: Pt would like to be seen in the Rheum clinic for Rheumatoid arthritis per referral. Explained the review process and that the Pt will get a call from the clinic in approx. 3 weeks. Records in epic     Action Taken: Message routed to:  [facility] ([facility] See above

## 2018-05-16 ENCOUNTER — TELEPHONE (OUTPATIENT)
Dept: ORTHOPEDICS | Facility: CLINIC | Age: 53
End: 2018-05-16

## 2018-05-16 NOTE — TELEPHONE ENCOUNTER
Pt requested a call and I called her back but don t have the necessary data to provide an opinion. She reports that she has no pain anymore in the R knee as long as she does not run.  She still has low level pain in the L knee.  She has no pain in the hip region as the hamstring injury in 2012  resolved completely.    Her joint fluid shows no signs of inflm or infection.  MAGALY is borderline (+).    I need the following, all from [facility] per pt:  XR of both knees done recently with report  MRI report of both knees from 4/17/2018  MRI images of hip from 6/21/12    I asked my sec'y to obtain these and once they're available I will call pt back.    MD Hernesto Harrison Family Professor  Oncology and Adult Reconstructive Surgery  Dept Orthopaedic Surgery, [facility]  [phone] office, [phone] pager  [email or web address]

## 2018-05-17 ENCOUNTER — TELEPHONE (OUTPATIENT)
Dept: ORTHOPEDICS | Facility: CLINIC | Age: 53
End: 2018-05-17

## 2018-05-18 ENCOUNTER — TELEPHONE (OUTPATIENT)
Dept: ORTHOPEDICS | Facility: CLINIC | Age: 53
End: 2018-05-18

## 2018-05-18 DIAGNOSIS — R22.42 KNEE MASS, LEFT: Primary | ICD-10-CM

## 2018-05-18 NOTE — TELEPHONE ENCOUNTER
Received call from patient requesting to schedule surgery with Dr. Bar. Patient has been scheduled for 6/6/18 at [place]. She will see PAC on 5/31/18 for her pre-op H&P. Patient requested surgery packet be mailed to her home.

## 2018-05-30 ASSESSMENT — ENCOUNTER SYMPTOMS
EYE IRRITATION: 0
POOR WOUND HEALING: 0
STIFFNESS: 0
DECREASED APPETITE: 0
NERVOUS/ANXIOUS: 1
FEVER: 0
EYE REDNESS: 1
EYE PAIN: 0
BACK PAIN: 0
ARTHRALGIAS: 1
ALTERED TEMPERATURE REGULATION: 0
DEPRESSION: 0
POLYPHAGIA: 0
HALLUCINATIONS: 0
DECREASED CONCENTRATION: 0
INSOMNIA: 1
INCREASED ENERGY: 0
MUSCLE WEAKNESS: 0
NAIL CHANGES: 0
NIGHT SWEATS: 0
POLYDIPSIA: 0
SKIN CHANGES: 0
FATIGUE: 0
WEIGHT LOSS: 0
PANIC: 0
DOUBLE VISION: 0
EYE WATERING: 0
CHILLS: 0
JOINT SWELLING: 0
MUSCLE CRAMPS: 0
MYALGIAS: 0
NECK PAIN: 0
WEIGHT GAIN: 0

## 2018-05-31 ENCOUNTER — ALLIED HEALTH/NURSE VISIT (OUTPATIENT)
Dept: SURGERY | Facility: CLINIC | Age: 53
End: 2018-05-31
Payer: COMMERCIAL

## 2018-05-31 ENCOUNTER — OFFICE VISIT (OUTPATIENT)
Dept: SURGERY | Facility: CLINIC | Age: 53
End: 2018-05-31
Payer: COMMERCIAL

## 2018-05-31 ENCOUNTER — ANESTHESIA EVENT (OUTPATIENT)
Dept: SURGERY | Facility: CLINIC | Age: 53
End: 2018-05-31
Payer: COMMERCIAL

## 2018-05-31 ENCOUNTER — APPOINTMENT (OUTPATIENT)
Dept: SURGERY | Facility: CLINIC | Age: 53
End: 2018-05-31
Payer: COMMERCIAL

## 2018-05-31 ENCOUNTER — OFFICE VISIT (OUTPATIENT)
Dept: ORTHOPEDICS | Facility: CLINIC | Age: 53
End: 2018-05-31
Payer: COMMERCIAL

## 2018-05-31 VITALS — BODY MASS INDEX: 24.06 KG/M2 | HEIGHT: 65 IN | WEIGHT: 144.4 LBS

## 2018-05-31 VITALS
HEART RATE: 71 BPM | BODY MASS INDEX: 24.06 KG/M2 | OXYGEN SATURATION: 99 % | HEIGHT: 65 IN | WEIGHT: 144.4 LBS | SYSTOLIC BLOOD PRESSURE: 124 MMHG | DIASTOLIC BLOOD PRESSURE: 76 MMHG

## 2018-05-31 DIAGNOSIS — M17.12 PRIMARY OSTEOARTHRITIS OF LEFT KNEE: ICD-10-CM

## 2018-05-31 DIAGNOSIS — Z01.818 PREOP EXAMINATION: Primary | ICD-10-CM

## 2018-05-31 DIAGNOSIS — M25.562 LEFT KNEE PAIN, UNSPECIFIED CHRONICITY: Primary | ICD-10-CM

## 2018-05-31 DIAGNOSIS — R22.42 KNEE MASS, LEFT: ICD-10-CM

## 2018-05-31 NOTE — TELEPHONE ENCOUNTER
General Rheumatology Intake Form      What is the reason for referral? Borderline positive MAGALY, knee pain      What is the name of the provider that referred you to us? Leo Suarez      Have you seen a Rheumatologist in the past?  no        Have you been diagnosed with Fibromyalgia? no       Who manages your care for this issue now? Leo Bar       What is your most urgent concern at this time? Borderline postitive MAGALY        Have you seen any specialist related to the reason you are coming here? orthoopedics       Where are we expecting records (labs, imaging or pathology) from? Records in [facility].    Offered appointment on 8/20/2018 with Dr. Gallegos, patient accepted. Appointment scheduled.      Adenike Mott CMA  5/31/2018 12:31 PM

## 2018-05-31 NOTE — MR AVS SNAPSHOT
After Visit Summary   5/31/2018    Nori Lazaro    MRN: [id number]           Patient Information     Date Of Birth          1965        Visit Information        Provider Department      5/31/2018 2:15 PM Leo Bar MD [facility]        Today's Diagnoses     Left knee pain, unspecified chronicity    -  1    Primary osteoarthritis of left knee           Follow-ups after your visit        Your next 10 appointments already scheduled     Jun 06, 2018   Procedure with Leo Bar MD   [facility] [place], Same Day Surgery (--)    [address] [id number] [phone]            Aug 20, 2018  7:30 AM CDT   (Arrive by 7:15 AM)   New Patient Visit with Daniel Gallegos MD   [facility] Rhe[facility]ology ([facility])    [address]  [address]  [place] [address] [phone]              Who to contact     Please call your clinic at [phone] to:    Ask questions about your health    Make or cancel appointments    Discuss your medicines    Learn about your test results    Speak to your doctor            Additional Information About Your Visit        MyChart Information     [facility] gives you secure access to your electronic health record. If you see a primary care provider, you can also send messages to your care team and make appointments. If you have questions, please call your primary care clinic.  If you do not have a primary care provider, please call [phone] and they will assist you.      [facility] is an electronic gateway that provides easy, online access to your medical records. With [facility], you can request a clinic appointment, read your test results, renew a prescription or communicate with your care team.     To access your existing account, please contact your [facility] or call [phone] for assistance.        Care EveryWhere ID     This is your Care EveryWhere ID. This could  "be used by other organizations to access your [facility] medical records  [id number]        Your Vitals Were     Height BMI (Body Mass Index)                1.651 m (5' 5\") 24.03 kg/m2           Blood Pressure from Last 3 Encounters:   05/31/18 124/76   08/13/14 114/76   06/03/14 120/78    Weight from Last 3 Encounters:   05/31/18 65.5 kg (144 lb 6.4 oz)   05/31/18 65.5 kg (144 lb 6.4 oz)   04/30/18 64.4 kg (141 lb 14.4 oz)              We Performed the Following     Alice-Operative Worksheet (Tumor/Adult Reconstruction: Knee/Hip/Fracture )        Primary Care Provider Office Phone # Fax #    Liliana Colindres [phone]       [facility] [address]  [place] [address]        Equal Access to Services     [facility] AH: Hadii aad ku hadasho Soomaali, waaxda luqadaha, qaybta kaalmada adeegyada, waxay cieloin haysagen aric joyce . So [facility] [phone].    ATENCIÓN: Si raquel soto, tiene a uriarte disposición servicios gratuitos de asistencia lingüística. Laraame al [phone].    We comply with applicable federal civil rights laws and [place] laws. We do not discriminate on the basis of race, color, national origin, age, disability, sex, sexual orientation, or gender identity.            Thank you!     Thank you for choosing [facility] ORTH[facility]  for your care. Our goal is always to provide you with excellent care. Hearing back from our patients is one way we can continue to improve our services. Please take a few minutes to complete the written survey that you may receive in the mail after your visit with us. Thank you!             Your Updated Medication List - Protect others around you: Learn how to safely use, store and throw away your medicines at www.disposemymeds.org.          This list is accurate as of 5/31/18 11:53 PM.  Always use your most recent med list.                   Brand Name Dispense Instructions for use Diagnosis    ALGAL OMEGA-3 DHA " 200 MG capsule   Generic drug:  Docosahexaenoic Acid      daily        cholecalciferol 1000 UNIT tablet    vitamin D3     daily        Glucosamine Sulfate 750 MG Tabs      daily        METHYL B-12 1000 MCG Lozg   Generic drug:  Methylcobalamin      daily        UNABLE TO FIND      Calm Magnesium- 2 teaspoons daily

## 2018-05-31 NOTE — PROGRESS NOTES
[facility], Orthopaedic Oncology Surgery Consultation  by Leo Bar M.D.    Christie Dick MRN# [id number]   Age: 52 year old YOB: 1965     Requesting physician: Angela Mccoy                                                          Results for CHRISTIE DICK (MRN [id number]) as of 5/31/2018 15:00   Ref. Range 4/30/2018 09:00 4/30/2018 09:35   Rheumatoid Factor Latest Ref Range: <20 IU/mL  <20   WBC Latest Ref Range: 4.0 - 11.0 10e9/L  4.0   Hemoglobin Latest Ref Range: 11.7 - 15.7 g/dL  15.6   Hematocrit Latest Ref Range: 35.0 - 47.0 %  46.5   Platelet Count Latest Ref Range: 150 - 450 10e9/L  206   RBC Count Latest Ref Range: 3.8 - 5.2 10e12/L  5.16   MCV Latest Ref Range: 78 - 100 fl  90   MCH Latest Ref Range: 26.5 - 33.0 pg  30.2   MCHC Latest Ref Range: 31.5 - 36.5 g/dL  33.5   RDW Latest Ref Range: 10.0 - 15.0 %  12.8   Sed Rate Latest Ref Range: 0 - 30 mm/h  7   Crystal Analysis Latest Ref Range: NOCRYS^No clincally significant crystals seen.  No clincally sign...    Color Fluid Unknown Yellow    Appearance Fluid Unknown Slightly Cloudy    WBC Fluid Latest Units: /uL 143    % Lymphocytes Fluid Latest Units: % 4    % Mono/Macro Fluid Latest Units: % 96          Component Value Flag Ref Range Units Status Collected Lab     MAGALY interpretation  (A) NEG^Negative  Final 04/30/2018  9:35 AM 51     Borderline Positive     Comment:                                        Reference range:   <1:40  NEGATIVE   1:40 - 1:80  BORDERLINE POSITIVE   >1:80 POSITIVE        MAGALY pattern 1 SPECKLED    Final 04/30/2018  9:35 AM 51     MAGALY titer 1 1:80    Final 04/30/2018  9:35 AM 51             I met with Christie today and reviewed the results of her laboratory testing as well as the MRI examinations.  We reviewed the images showing the nodular mass along the pedis anserine tendons of her left knee.  She describes symptoms of zinging or electric shocklike  sensations extending out from her knee to her foot.  I am uncertain of the cause of the symptoms.  However she does have evidence of synovial thickening with cyst formation and both knees despite her symptoms only been present on the left side.    I recommended proceeding with a excisional biopsy of the nodular mass along the peds anserine tendons of the left knee joint.  In addition I advised that we consider a synovial biopsy of the left knee joint through separate incision.  I believe this will help heal diagnostic information as to the cause of her knee symptoms.  To date, the laboratory workup has been unrevealing other than the borderline abnormality in her MAGALY raising the question of an early mild form of lupus.  Depending upon the operative findings, we may also consider a urologic consultation in the future as well.     I discussed the risks, benefits, alternatives regarding the proposed surgery with the patient who both demonstrated understanding and indicated a desire to proceed with the surgery as planned.    MD Hernesto Harrison Family Professor  Oncology and Adult Reconstructive Surgery  Dept Orthopaedic Surgery, [facility]  [phone] office, [phone] pager  [email or web address]    Total Time = 25 min, 50% of which was spent in counseling and coordination of care as documented above.

## 2018-05-31 NOTE — H&P
Pre-Operative H & P     CC:  Preoperative exam to assess for increased cardiopulmonary risk while undergoing surgery and anesthesia.    Date of Encounter: May 31, 2018   Primary Care Physician:  Liliana Luciano   Reason for Visit/Surgery:  Knee mass, left [M25.862]      HPI  Nori Lazaro is a 53 year old female who presents for pre-operative H & P in preparation for an Excision Biopsy Of Left Knee Mass on 6/6/18 with Dr. Bar at the [facility].     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  She has been experiencing pain for 1yr  while running. She states that she had done a boot camp class and the day after Thanksgiving she could barley walk. She has tried PT and activity modification without help.  An MRI was obtained recently and it showed  a soft tissue mass is identified beneath the pedis tendons and the posterior lateral aspect of her medial femoral condyle of the left knee.  Dr. Bar  recommended an excisional biopsy be performed.  Ms. Lazaro does extensive work-outs 3x weekly and has no known cardiopulmonary disease.    History is obtained from the patient and  medical record including Care Everywhere.        Past Medical History  Past Medical History:   Diagnosis Date     Endometrial polyp      Hx of skin cancer, basal cell 2015     Other and unspecified hyperlipidemia         Past Surgical History  Past Surgical History:   Procedure Laterality Date     BIOPSY OF SKIN LESION       C PELVIS/HIP JOINT SURGERY UNLISTED  6/2012    hamstring tear /reattachment     DILATION AND CURETTAGE, HYSTEROSCOPY DIAGNOSTIC, COMBINED  8/13/2014    Procedure: COMBINED DILATION AND CURETTAGE, HYSTEROSCOPY DIAGNOSTIC;  Surgeon: Liliana Luciano MD;  Location: [facility]       Hx of Blood transfusions/reactions: No transfusion history       Personal or FH with difficulty with Anesthesia:  PONV    Prior to Admission Medications  Current Outpatient Prescriptions    Medication Sig Dispense Refill     cholecalciferol (VITAMIN D3) 1000 UNIT tablet        Docosahexaenoic Acid (ALGAL OMEGA-3 DHA) 200 MG capsule        Glucosamine Sulfate 750 MG TABS        magnesium citrate solution        Methylcobalamin (METHYL B-12) 1000 MCG LOZG            Allergies  Review of patient's allergies indicates no known allergies.     Social History  Social History     Social History     Marital status:      Spouse name: N/A     Number of children: N/A     Years of education: N/A     Occupational History     Not on file.     Social History Main Topics     Smoking status: Never Smoker     Smokeless tobacco: Never Used     Alcohol use Yes      Comment: on average 1 glass of wine or 1 beer a week     Drug use: No     Sexual activity: Yes     Partners: Male     Birth control/ protection: Post-menopausal     Other Topics Concern     Not on file     Social History Narrative          Family History  No family history of bleeding, clotting disorders or complications with anesthesia.    ROS/MED HX     ENT/Pulmonary:  - neg pulmonary ROS     Neurologic:  - neg neurologic ROS     Cardiovascular:  - neg cardiovascular ROS     METS/Exercise Tolerance:  >4 METS   Hematologic:  - neg hematologic  ROS     Musculoskeletal:   (+) , , other musculoskeletal-      (-) fracture lower extremity (chronic bilateral knee pain)   GI/Hepatic:  - neg GI/hepatic ROS     Renal/Genitourinary:  - ROS Renal section negative     Endo:  - neg endo ROS     Psychiatric:  - neg psychiatric ROS     Infectious Disease:  - neg infectious disease ROS     Malignancy:   (+) Malignancy History of Skin  Skin CA Remission status post Surgery,       Other:    (+) no H/O Chronic Pain,          Cardiology Tests: (personally reviewed):   Review Results Below in A/P    Labs: (personally reviewed):  Lab Results   Component Value Date    WBC 4.0 04/30/2018    HGB 15.6 04/30/2018    HCT 46.5 04/30/2018     04/30/2018          Physical  "Exam:  No LMP recorded. Patient is postmenopausal.   Vital signs:  /76  Pulse 71  Ht 1.651 m (5' 5\")  Wt 65.5 kg (144 lb 6.4 oz)  SpO2 99%  BMI 24.03 kg/m2    Constitutional: Awake, alert, cooperative, no apparent distress, and appears stated age.  Eyes: Pupils equal, round and reactive to light, sclera clear, conjunctiva normal.  HENT: Normocephalic, oral pharynx with moist mucus membranes. No goiter appreciated.   Respiratory: Clear to auscultation bilaterally, no crackles or wheezing.  Cardiovascular: Regular rate and rhythm and no overt murmur noted.  No carotid bruits auscultated. No edema. Palpable pulses to radial  DP and PT arteries.   GI: Normal bowel sounds, soft, non-distended, non-tender, no masses palpated  Skin: Warm and dry.  No rashes at anticipated surgical site.   Musculoskeletal: Full extension of the neck.  No redness, warmth, or swelling of exposed joints noted. Gross motor strength is normal.    Neurologic: Awake, alert, oriented to name, place and time.  Gait is normal.   Neuropsychiatric: Calm, cooperative. Normal affect.     Assessment/Plan  Nori Lazaro is a 53 year old female who presents for pre-operative H & P in preparation for an Excision Biopsy Of Left Knee Mass on 6/6/18 with Dr. Bar at the [facility].    PAC referral for risk assessment and optimization for anesthesia with comorbid conditions of:    Pre-operative considerations:  1.  Cardiac:  Functional status METS >4    Risk of Major Adverse Cardiac event: 0.4%  -No known cardiac disease  2.  Pulm:   FALGUNI risk:  low  -Never smoker, No known pulmonary disease    3.  GI:  PONV    Patient is optimized and is an acceptable candidate for the proposed procedure.  No further diagnostic evaluation is needed.      AVS given to patient regarding medication instructions,  surgery time/arrival time and NPO status.  Sarah CONNER-C   Preoperative Assessment " [facility]  Phone: [phone]  Fax: [phone]

## 2018-05-31 NOTE — ANESTHESIA PREPROCEDURE EVALUATION
Anesthesia Evaluation     . Pt has had prior anesthetic. Type: General and MAC    History of anesthetic complications   - PONV        ROS/MED HX    ENT/Pulmonary:  - neg pulmonary ROS     Neurologic:  - neg neurologic ROS     Cardiovascular:  - neg cardiovascular ROS       METS/Exercise Tolerance:  >4 METS   Hematologic:  - neg hematologic  ROS       Musculoskeletal:   (+) , , other musculoskeletal-      (-) fracture lower extremity (chronic bilateral knee pain)   GI/Hepatic:  - neg GI/hepatic ROS       Renal/Genitourinary:  - ROS Renal section negative       Endo:  - neg endo ROS       Psychiatric:  - neg psychiatric ROS       Infectious Disease:  - neg infectious disease ROS       Malignancy:   (+) Malignancy History of Skin  Skin CA Remission status post Surgery,         Other:    (+) no H/O Chronic Pain,                   Physical Exam  Normal systems: cardiovascular and pulmonary    Airway   Mallampati: I  Neck ROM: full    Dental     Cardiovascular   Rhythm and rate: regular and normal      Pulmonary    breath sounds clear to auscultation               PAC Discussion and Assessment    ASA Classification: 1  Case is suitable for: [facility]  Anesthetic techniques and relevant risks discussed: GA  Invasive monitoring and risk discussed:   Types:   Possibility and Risk of blood transfusion discussed:   NPO instructions given:   Additional anesthetic preparation and risks discussed:   Needs early admission to pre-op area:   Other:     PAC Resident/NP Anesthesia Assessment:  Nori Lazaro is a 53 year old female who presents for pre-operative H & P in preparation for an Excision Biopsy Of Left Knee Mass on 6/6/18 with Dr. Bar at the [facility].    PAC referral for risk assessment and optimization for anesthesia with comorbid conditions of:    Pre-operative considerations:  1.  Cardiac:  Functional status METS >4    Risk of Major Adverse Cardiac event: 0.4%  -No  known cardiac disease  2.  Pulm:   FALGUNI risk:  low  -Never smoker, No known pulmonary disease    3.  GI:  PONV    Patient is optimized and is an acceptable candidate for the proposed procedure.  No further diagnostic evaluation is needed.    Sarah Shin MS, PA-C  05/31/18 9:08 AM          Mid-Level Provider/Resident:   Date:   Time:     Attending Anesthesiologist Anesthesia Assessment:  53 year old for excisional biopsy of left knee mass. Patient has no significant cardiac or pulmonary disease; active.     Patient/case discussed with XIOMARA. No need to see patient. Patient is appropriate for the planned procedure without further work-up or medical management.      Reviewed and Signed by PAC Anesthesiologist  Anesthesiologist: [email or web address]  Date: 5/31/2018  Time:   Pass/Fail: Pass  Disposition:     PAC Pharmacist Assessment:        Pharmacist:   Date:   Time:      Anesthesia Plan      History & Physical Review      ASA Status:  1 .    NPO Status:  > 6 hours    Plan for General and LMA with Intravenous and Propofol induction. Maintenance will be TIVA.    PONV prophylaxis:  Ondansetron (or other 5HT-3) and Dexamethasone or Solumedrol       Postoperative Care      Consents                          .

## 2018-05-31 NOTE — LETTER
5/31/2018     RE: Christie Dick  [address]  [place] [address]     Dear Colleague,    Thank you for referring your patient, Christie Dick, to the [facility] at [facility]. Please see a copy of my visit note below.        [facility], Orthopaedic Oncology Surgery Consultation  by Leo Bar M.D.    Christie Dick MRN# [id number]   Age: 52 year old YOB: 1965     Requesting physician: Angela Mccoy                                                          Results for CHRISTIE DICK (MRN [id number]) as of 5/31/2018 15:00   Ref. Range 4/30/2018 09:00 4/30/2018 09:35   Rheumatoid Factor Latest Ref Range: <20 IU/mL  <20   WBC Latest Ref Range: 4.0 - 11.0 10e9/L  4.0   Hemoglobin Latest Ref Range: 11.7 - 15.7 g/dL  15.6   Hematocrit Latest Ref Range: 35.0 - 47.0 %  46.5   Platelet Count Latest Ref Range: 150 - 450 10e9/L  206   RBC Count Latest Ref Range: 3.8 - 5.2 10e12/L  5.16   MCV Latest Ref Range: 78 - 100 fl  90   MCH Latest Ref Range: 26.5 - 33.0 pg  30.2   MCHC Latest Ref Range: 31.5 - 36.5 g/dL  33.5   RDW Latest Ref Range: 10.0 - 15.0 %  12.8   Sed Rate Latest Ref Range: 0 - 30 mm/h  7   Crystal Analysis Latest Ref Range: NOCRYS^No clincally significant crystals seen.  No clincally sign...    Color Fluid Unknown Yellow    Appearance Fluid Unknown Slightly Cloudy    WBC Fluid Latest Units: /uL 143    % Lymphocytes Fluid Latest Units: % 4    % Mono/Macro Fluid Latest Units: % 96          Component Value Flag Ref Range Units Status Collected Lab     MAGALY interpretation  (A) NEG^Negative  Final 04/30/2018  9:35 AM 51     Borderline Positive     Comment:                                        Reference range:   <1:40  NEGATIVE   1:40 - 1:80  BORDERLINE POSITIVE   >1:80 POSITIVE        MAGALY pattern 1 SPECKLED    Final 04/30/2018  9:35 AM 51     MAGALY titer 1 1:80    Final 04/30/2018  9:35  AM 51             I met with Nori today and reviewed the results of her laboratory testing as well as the MRI examinations.  We reviewed the images showing the nodular mass along the pedis anserine tendons of her left knee.  She describes symptoms of zinging or electric shocklike sensations extending out from her knee to her foot.  I am uncertain of the cause of the symptoms.  However she does have evidence of synovial thickening with cyst formation and both knees despite her symptoms only been present on the left side.    I recommended proceeding with a excisional biopsy of the nodular mass along the peds anserine tendons of the left knee joint.  In addition I advised that we consider a synovial biopsy of the left knee joint through separate incision.  I believe this will help heal diagnostic information as to the cause of her knee symptoms.  To date, the laboratory workup has been unrevealing other than the borderline abnormality in her MAGALY raising the question of an early mild form of lupus.  Depending upon the operative findings, we may also consider a urologic consultation in the future as well.     I discussed the risks, benefits, alternatives regarding the proposed surgery with the patient who both demonstrated understanding and indicated a desire to proceed with the surgery as planned.    MD Hernesto Harrison Family Professor  Oncology and Adult Reconstructive Surgery  Dept Orthopaedic Surgery, [facility]  [phone] office, [phone] pager  [email or web address]    Total Time = 25 min, 50% of which was spent in counseling and coordination of care as documented above.    Again, thank you for allowing me to participate in the care of your patient.      Sincerely,    Leo Bar MD

## 2018-05-31 NOTE — PATIENT INSTRUCTIONS
Preparing for Your Surgery      Name:  Nori Lazaro   MRN:  [id number]   :  1965   Today's Date:  2018     Arriving for surgery:  Surgery date:  18  Arrival time:  5:30 am    Please come to:   [facility] [address]  [address] [place]  [address]    - parking is available in front of [facility] from 5:15AM to 8:00PM. If you prefer, park your car in the Green Lot.    -Proceed to the 3rd floor, check in at the Adult Surgery Waiting Lounge. [phone]    If an escort is needed stop at the Information Desk in the lobby. Inform the information person that you are here for surgery. An escort to the Adult Surgery Waiting Lounge will be provided.    -  Bring your ID and insurance card.    What can I eat or drink?  -  You may have solid food or milk products until 8 hours prior to your surgery. (Until 11:30 pm 18 )  -  You may have water, apple juice or 7up/Sprite until 2 hours prior to your surgery. (5:30 pm )    Which medicines can I take?       -  Do not bring your own medications to the hospital.        -  No Aspirin products 7 days prior to surgery.        -  Follow Orthopedic Clinic instructions regarding Ibuprofen. If no instructions given, NO Ibuprofen the day prior to surgery.         -  Hold all Vitamins and supplements 1 week prior to surgery.    -  Please take these medications the morning of surgery:  Acetaminophen (Tylenol) if needed    How do I prepare myself?  -  Take two showers: one the night before surgery; and one the morning of surgery.         Use Scrubcare or Hibiclens to wash from neck down.  You may use your own shampoo and conditioner. No other hair products.   -  Do NOT use lotion, powder, colognes, deodorant, or antiperspirant the day of your surgery.  -  Do NOT wear any makeup, fingernail polish or jewelry.    Questions or Concerns:  If you have questions or concerns prior to your surgery, call [phone].  (Mon - Fri   8 am- 5:30 pm)  Questions after surgery, contact your Surgeons office.    AFTER YOUR SURGERY  Breathing exercises   Breathing exercises help you recover faster. Take deep breaths and let the air out slowly. This will:     Help you wake up after surgery.    Help prevent complications like pneumonia.  Preventing complications will help you go home sooner.   We may give you a breathing device (incentive spirometer) to encourage you to breathe deeply.   Nausea and vomiting   You may feel sick to your stomach after surgery; if so, let your nurse know.    Pain control:  After surgery, you may have pain. Our goal is to help you manage your pain. Pain medicine will help you feel comfortable enough to do activities that will help you heal.  These activities may include breathing exercises, walking and physical therapy.   To help your health care team treat your pain we will ask: 1) If you have pain  2) where it is located 3) describe your pain in your words  Methods of pain control include medications given by mouth, vein or by nerve block for some surgeries.  Sequential Compression Device (SCD) or Pneumo Boots:  You may need to wear SCD S on your legs or feet. These are wraps connected to a machine that pumps in air and releases it. The repeated pumping helps prevent blood clots from forming.

## 2018-05-31 NOTE — NURSING NOTE
"Reason For Visit:   Chief Complaint   Patient presents with     RECHECK     Left knee--Surgery discussion, scheduled for 6/6/18     Surgery discussion--scheduled for 6/6/18    Pain Assessment  Patient Currently in Pain: No    Ht 1.651 m (5' 5\")  Wt 65.5 kg (144 lb 6.4 oz)  BMI 24.03 kg/m2       No Known Allergies    Current Outpatient Prescriptions   Medication     cholecalciferol (VITAMIN D3) 1000 UNIT tablet     Docosahexaenoic Acid (ALGAL OMEGA-3 DHA) 200 MG capsule     Glucosamine Sulfate 750 MG TABS     Methylcobalamin (METHYL B-12) 1000 MCG [facility]     UNABLE TO FIND     No current facility-administered medications for this visit.            Charisma Briceño, ATC    "

## 2018-05-31 NOTE — MR AVS SNAPSHOT
After Visit Summary   2018    Nori Lazaro    MRN: [id number]           Patient Information     Date Of Birth          1965        Visit Information        Provider Department      2018 9:30 AM Rn, [facility]        Care Instructions    Preparing for Your Surgery      Name:  Nori Lazaro   MRN:  [id number]   :  1965   Today's Date:  2018     Arriving for surgery:  Surgery date:  18  Arrival time:  5:30 am    Please come to:   [facility] [address]  [address] [place], [place] [address]  [address]    - parking is available in front of [facility] from 5:15AM to 8:00PM. If you prefer, park your car in the Green Lot.    -Proceed to the 3rd floor, check in at the Adult Surgery Waiting Lounge. [phone]    If an escort is needed stop at the Information Desk in the lobby. Inform the information person that you are here for surgery. An escort to the Adult Surgery Waiting Lounge will be provided.    -  Bring your ID and insurance card.    What can I eat or drink?  -  You may have solid food or milk products until 8 hours prior to your surgery. (Until 11:30 pm 18 )  -  You may have water, apple juice or 7up/Sprite until 2 hours prior to your surgery. (5:30 pm )    Which medicines can I take?       -  Do not bring your own medications to the hospital.        -  No Aspirin products 7 days prior to surgery.        -  Follow Orthopedic Clinic instructions regarding Ibuprofen. If no instructions given, NO Ibuprofen the day prior to surgery.         -  Hold all Vitamins and supplements 1 week prior to surgery.    -  Please take these medications the morning of surgery:  Acetaminophen (Tylenol) if needed    How do I prepare myself?  -  Take two showers: one the night before surgery; and one the morning of surgery.         Use Scrubcare or Hibiclens to wash from neck down.  You may use your own  shampoo and conditioner. No other hair products.   -  Do NOT use lotion, powder, colognes, deodorant, or antiperspirant the day of your surgery.  -  Do NOT wear any makeup, fingernail polish or jewelry.    Questions or Concerns:  If you have questions or concerns prior to your surgery, call [phone]. (Mon - Fri   8 am- 5:30 pm)  Questions after surgery, contact your Surgeons office.    AFTER YOUR SURGERY  Breathing exercises   Breathing exercises help you recover faster. Take deep breaths and let the air out slowly. This will:     Help you wake up after surgery.    Help prevent complications like pneumonia.  Preventing complications will help you go home sooner.   We may give you a breathing device (incentive spirometer) to encourage you to breathe deeply.   Nausea and vomiting   You may feel sick to your stomach after surgery; if so, let your nurse know.    Pain control:  After surgery, you may have pain. Our goal is to help you manage your pain. Pain medicine will help you feel comfortable enough to do activities that will help you heal.  These activities may include breathing exercises, walking and physical therapy.   To help your health care team treat your pain we will ask: 1) If you have pain  2) where it is located 3) describe your pain in your words  Methods of pain control include medications given by mouth, vein or by nerve block for some surgeries.  Sequential Compression Device (SCD) or Pneumo Boots:  You may need to wear SCD S on your legs or feet. These are wraps connected to a machine that pumps in air and releases it. The repeated pumping helps prevent blood clots from forming.                     Follow-ups after your visit        Your next 10 appointments already scheduled     May 31, 2018  9:30 AM CDT   (Arrive by 9:15 AM)   PAC RN ASSESSMENT with  Pac Rn   [facility] ([facility])    [address] [facility]  4th Floor  [facility]  [id number] [phone]            May 31, 2018 10:00 AM CDT   (Arrive by 9:45 AM)   PAC Anesthesia Consult with  Pac Anesthesiologist   [facility] ([facility])    [address] [place] [address]  4th [facility] [id number] [phone]            May 31, 2018  2:15 PM CDT   (Arrive by 2:00 PM)   RETURN TUMOR VISIT with Leo Bar MD   [facility] ([facility])    [address] [place] [id number] [phone]            Jun 06, 2018   Procedure with Leo Bar MD   [facility] [place] Day Surgery (--)    [address] [facility] [id number] [phone]              Who to contact     Please call your clinic at [phone] to:    Ask questions about your health    Make or cancel appointments    Discuss your medicines    Learn about your test results    Speak to your doctor            Additional Information About Your Visit        WinView Information     WinView gives you secure access to your electronic health record. If you see a primary care provider, you can also send messages to your care team and make appointments. If you have questions, please call your primary care clinic.  If you do not have a primary care provider, please call [phone] and they will assist you.      WinView is an electronic gateway that provides easy, online access to your medical records. With WinView, you can request a clinic appointment, read your test results, renew a prescription or communicate with your care team.     To access your existing account, please contact your [facility] or call [phone] for assistance.        Care EveryWhere ID     This is your Care EveryWhere ID. This could be used by other organizations to access your [place] medical records  [id number]         Blood Pressure from Last 3 Encounters:   05/31/18 124/76   08/13/14 114/76    06/03/14 120/78    Weight from Last 3 Encounters:   05/31/18 65.5 kg (144 lb 6.4 oz)   04/30/18 64.4 kg (141 lb 14.4 oz)   08/13/14 62.6 kg (138 lb)              Today, you had the following     No orders found for display       Primary Care Provider Office Phone # Fax #    Liliana ANTOINETTE Colindres [phone]       [facility] [address]  [facility] [address]        Equal Access to Services     [facility]: Hadii aad ku hadasho Soomaali, waaxda luqadaha, qaybta kaalmada aderichard, sammy joyce . So [facility] [phone].    ATENCIÓN: Si habla español, tiene a uriarte disposición servicios gratuitos de asistencia lingüística. [facility] [phone].    We comply with applicable federal civil rights laws and [place] laws. We do not discriminate on the basis of race, color, national origin, age, disability, sex, sexual orientation, or gender identity.            Thank you!     Thank you for choosing [facility]  for your care. Our goal is always to provide you with excellent care. Hearing back from our patients is one way we can continue to improve our services. Please take a few minutes to complete the written survey that you may receive in the mail after your visit with us. Thank you!             Your Updated Medication List - Protect others around you: Learn how to safely use, store and throw away your medicines at www.disposemymeds.org.          This list is accurate as of 5/31/18  9:06 AM.  Always use your most recent med list.                   Brand Name Dispense Instructions for use Diagnosis    ALGAL OMEGA-3  MG capsule   Generic drug:  Docosahexaenoic Acid      daily        cholecalciferol 1000 UNIT tablet    vitamin D3     daily        Glucosamine Sulfate 750 MG Tabs      daily        METHYL B-12 1000 MCG Lozg   Generic drug:  Methylcobalamin      daily        UNABLE TO FIND      Calm Magnesium- 2 teaspoons  daily

## 2018-06-06 ENCOUNTER — HOSPITAL ENCOUNTER (OUTPATIENT)
Facility: CLINIC | Age: 53
Discharge: HOME | End: 2018-06-06
Attending: ORTHOPAEDIC SURGERY | Admitting: ORTHOPAEDIC SURGERY
Payer: COMMERCIAL

## 2018-06-06 ENCOUNTER — ANESTHESIA (OUTPATIENT)
Dept: SURGERY | Facility: CLINIC | Age: 53
End: 2018-06-06
Payer: COMMERCIAL

## 2018-06-06 ENCOUNTER — SURGERY (OUTPATIENT)
Age: 53
End: 2018-06-06

## 2018-06-06 VITALS
RESPIRATION RATE: 16 BRPM | BODY MASS INDEX: 24.16 KG/M2 | DIASTOLIC BLOOD PRESSURE: 69 MMHG | OXYGEN SATURATION: 96 % | TEMPERATURE: 97.1 F | SYSTOLIC BLOOD PRESSURE: 111 MMHG | HEIGHT: 64 IN | WEIGHT: 141.54 LBS

## 2018-06-06 DIAGNOSIS — M25.562 LEFT KNEE PAIN, UNSPECIFIED CHRONICITY: Primary | ICD-10-CM

## 2018-06-06 LAB — GLUCOSE BLDC GLUCOMTR-MCNC: 107 MG/DL (ref 70–99)

## 2018-06-06 PROCEDURE — 27210794 ZZH OR GENERAL SUPPLY STERILE: Performed by: ORTHOPAEDIC SURGERY

## 2018-06-06 PROCEDURE — 37000008 ZZH ANESTHESIA TECHNICAL FEE, 1ST 30 MIN: Performed by: ORTHOPAEDIC SURGERY

## 2018-06-06 PROCEDURE — 40000170 ZZH STATISTIC PRE-PROCEDURE ASSESSMENT II: Performed by: ORTHOPAEDIC SURGERY

## 2018-06-06 PROCEDURE — 71000015 ZZH RECOVERY PHASE 1 LEVEL 2 EA ADDTL HR: Performed by: ORTHOPAEDIC SURGERY

## 2018-06-06 PROCEDURE — 87176 TISSUE HOMOGENIZATION CULTR: CPT | Performed by: ORTHOPAEDIC SURGERY

## 2018-06-06 PROCEDURE — 25000128 H RX IP 250 OP 636: Performed by: ANESTHESIOLOGY

## 2018-06-06 PROCEDURE — 88305 TISSUE EXAM BY PATHOLOGIST: CPT | Performed by: ORTHOPAEDIC SURGERY

## 2018-06-06 PROCEDURE — 87070 CULTURE OTHR SPECIMN AEROBIC: CPT | Performed by: ORTHOPAEDIC SURGERY

## 2018-06-06 PROCEDURE — 88331 PATH CONSLTJ SURG 1 BLK 1SPC: CPT | Mod: 26 | Performed by: ORTHOPAEDIC SURGERY

## 2018-06-06 PROCEDURE — 88307 TISSUE EXAM BY PATHOLOGIST: CPT | Mod: 26 | Performed by: ORTHOPAEDIC SURGERY

## 2018-06-06 PROCEDURE — 25000132 ZZH RX MED GY IP 250 OP 250 PS 637: Performed by: ORTHOPAEDIC SURGERY

## 2018-06-06 PROCEDURE — 88307 TISSUE EXAM BY PATHOLOGIST: CPT | Performed by: ORTHOPAEDIC SURGERY

## 2018-06-06 PROCEDURE — 25000125 ZZHC RX 250: Performed by: ANESTHESIOLOGY

## 2018-06-06 PROCEDURE — 36000059 ZZH SURGERY LEVEL 3 EA 15 ADDTL MIN UMMC: Performed by: ORTHOPAEDIC SURGERY

## 2018-06-06 PROCEDURE — 87075 CULTR BACTERIA EXCEPT BLOOD: CPT | Performed by: ORTHOPAEDIC SURGERY

## 2018-06-06 PROCEDURE — 88305 TISSUE EXAM BY PATHOLOGIST: CPT | Mod: 26 | Performed by: ORTHOPAEDIC SURGERY

## 2018-06-06 PROCEDURE — 37000009 ZZH ANESTHESIA TECHNICAL FEE, EACH ADDTL 15 MIN: Performed by: ORTHOPAEDIC SURGERY

## 2018-06-06 PROCEDURE — 25000128 H RX IP 250 OP 636: Performed by: ORTHOPAEDIC SURGERY

## 2018-06-06 PROCEDURE — 82962 GLUCOSE BLOOD TEST: CPT

## 2018-06-06 PROCEDURE — 36000057 ZZH SURGERY LEVEL 3 1ST 30 MIN - UMMC: Performed by: ORTHOPAEDIC SURGERY

## 2018-06-06 PROCEDURE — 71000014 ZZH RECOVERY PHASE 1 LEVEL 2 FIRST HR: Performed by: ORTHOPAEDIC SURGERY

## 2018-06-06 PROCEDURE — 88331 PATH CONSLTJ SURG 1 BLK 1SPC: CPT | Performed by: ORTHOPAEDIC SURGERY

## 2018-06-06 PROCEDURE — 71000027 ZZH RECOVERY PHASE 2 EACH 15 MINS: Performed by: ORTHOPAEDIC SURGERY

## 2018-06-06 PROCEDURE — 27210995 ZZH RX 272: Performed by: ORTHOPAEDIC SURGERY

## 2018-06-06 RX ORDER — ONDANSETRON 2 MG/ML
INJECTION INTRAMUSCULAR; INTRAVENOUS PRN
Status: DISCONTINUED | OUTPATIENT
Start: 2018-06-06 | End: 2018-06-06

## 2018-06-06 RX ORDER — NALOXONE HYDROCHLORIDE 0.4 MG/ML
.1-.4 INJECTION, SOLUTION INTRAMUSCULAR; INTRAVENOUS; SUBCUTANEOUS
Status: DISCONTINUED | OUTPATIENT
Start: 2018-06-06 | End: 2018-06-06 | Stop reason: HOSPADM

## 2018-06-06 RX ORDER — OXYCODONE AND ACETAMINOPHEN 5; 325 MG/1; MG/1
1 TABLET ORAL
Status: COMPLETED | OUTPATIENT
Start: 2018-06-06 | End: 2018-06-06

## 2018-06-06 RX ORDER — SODIUM CHLORIDE, SODIUM LACTATE, POTASSIUM CHLORIDE, CALCIUM CHLORIDE 600; 310; 30; 20 MG/100ML; MG/100ML; MG/100ML; MG/100ML
INJECTION, SOLUTION INTRAVENOUS CONTINUOUS PRN
Status: DISCONTINUED | OUTPATIENT
Start: 2018-06-06 | End: 2018-06-06

## 2018-06-06 RX ORDER — CEFAZOLIN SODIUM 2 G/100ML
2 INJECTION, SOLUTION INTRAVENOUS
Status: COMPLETED | OUTPATIENT
Start: 2018-06-06 | End: 2018-06-06

## 2018-06-06 RX ORDER — FENTANYL CITRATE 50 UG/ML
INJECTION, SOLUTION INTRAMUSCULAR; INTRAVENOUS PRN
Status: DISCONTINUED | OUTPATIENT
Start: 2018-06-06 | End: 2018-06-06

## 2018-06-06 RX ORDER — OXYCODONE AND ACETAMINOPHEN 5; 325 MG/1; MG/1
1-2 TABLET ORAL EVERY 4 HOURS PRN
Qty: 12 TABLET | Refills: 0 | Status: SHIPPED | OUTPATIENT
Start: 2018-06-06 | End: 2018-08-04

## 2018-06-06 RX ORDER — HYDROCODONE BITARTRATE AND ACETAMINOPHEN 5; 325 MG/1; MG/1
1-2 TABLET ORAL EVERY 4 HOURS PRN
Qty: 20 TABLET | Refills: 0 | Status: SHIPPED | OUTPATIENT
Start: 2018-06-06 | End: 2018-08-04

## 2018-06-06 RX ORDER — PROPOFOL 10 MG/ML
INJECTION, EMULSION INTRAVENOUS PRN
Status: DISCONTINUED | OUTPATIENT
Start: 2018-06-06 | End: 2018-06-06

## 2018-06-06 RX ORDER — MAGNESIUM HYDROXIDE 1200 MG/15ML
LIQUID ORAL PRN
Status: DISCONTINUED | OUTPATIENT
Start: 2018-06-06 | End: 2018-06-06 | Stop reason: HOSPADM

## 2018-06-06 RX ORDER — DEXAMETHASONE SODIUM PHOSPHATE 4 MG/ML
INJECTION, SOLUTION INTRA-ARTICULAR; INTRALESIONAL; INTRAMUSCULAR; INTRAVENOUS; SOFT TISSUE PRN
Status: DISCONTINUED | OUTPATIENT
Start: 2018-06-06 | End: 2018-06-06

## 2018-06-06 RX ORDER — LIDOCAINE HYDROCHLORIDE 20 MG/ML
INJECTION, SOLUTION INFILTRATION; PERINEURAL PRN
Status: DISCONTINUED | OUTPATIENT
Start: 2018-06-06 | End: 2018-06-06

## 2018-06-06 RX ORDER — SODIUM CHLORIDE, SODIUM LACTATE, POTASSIUM CHLORIDE, CALCIUM CHLORIDE 600; 310; 30; 20 MG/100ML; MG/100ML; MG/100ML; MG/100ML
INJECTION, SOLUTION INTRAVENOUS CONTINUOUS
Status: DISCONTINUED | OUTPATIENT
Start: 2018-06-06 | End: 2018-06-06 | Stop reason: HOSPADM

## 2018-06-06 RX ORDER — ONDANSETRON 4 MG/1
4 TABLET, ORALLY DISINTEGRATING ORAL EVERY 30 MIN PRN
Status: DISCONTINUED | OUTPATIENT
Start: 2018-06-06 | End: 2018-06-06 | Stop reason: HOSPADM

## 2018-06-06 RX ORDER — HYDROMORPHONE HYDROCHLORIDE 1 MG/ML
.3-.5 INJECTION, SOLUTION INTRAMUSCULAR; INTRAVENOUS; SUBCUTANEOUS EVERY 10 MIN PRN
Status: DISCONTINUED | OUTPATIENT
Start: 2018-06-06 | End: 2018-06-06 | Stop reason: HOSPADM

## 2018-06-06 RX ORDER — HYDROCODONE BITARTRATE AND ACETAMINOPHEN 5; 325 MG/1; MG/1
1-2 TABLET ORAL
Status: DISCONTINUED | OUTPATIENT
Start: 2018-06-06 | End: 2018-06-06 | Stop reason: HOSPADM

## 2018-06-06 RX ORDER — PROPOFOL 10 MG/ML
INJECTION, EMULSION INTRAVENOUS CONTINUOUS PRN
Status: DISCONTINUED | OUTPATIENT
Start: 2018-06-06 | End: 2018-06-06

## 2018-06-06 RX ORDER — ONDANSETRON 2 MG/ML
4 INJECTION INTRAMUSCULAR; INTRAVENOUS EVERY 30 MIN PRN
Status: DISCONTINUED | OUTPATIENT
Start: 2018-06-06 | End: 2018-06-06 | Stop reason: HOSPADM

## 2018-06-06 RX ADMIN — Medication 0.2 MG: at 09:50

## 2018-06-06 RX ADMIN — Medication 0.3 MG: at 09:33

## 2018-06-06 RX ADMIN — FENTANYL CITRATE 25 MCG: 50 INJECTION, SOLUTION INTRAMUSCULAR; INTRAVENOUS at 07:37

## 2018-06-06 RX ADMIN — ONDANSETRON 4 MG: 2 INJECTION INTRAMUSCULAR; INTRAVENOUS at 08:33

## 2018-06-06 RX ADMIN — LIDOCAINE HYDROCHLORIDE 60 MG: 20 INJECTION, SOLUTION INFILTRATION; PERINEURAL at 07:37

## 2018-06-06 RX ADMIN — SODIUM CHLORIDE, POTASSIUM CHLORIDE, SODIUM LACTATE AND CALCIUM CHLORIDE: 600; 310; 30; 20 INJECTION, SOLUTION INTRAVENOUS at 07:37

## 2018-06-06 RX ADMIN — PROPOFOL 30 MG: 10 INJECTION, EMULSION INTRAVENOUS at 08:06

## 2018-06-06 RX ADMIN — DEXAMETHASONE SODIUM PHOSPHATE 6 MG: 4 INJECTION, SOLUTION INTRAMUSCULAR; INTRAVENOUS at 07:37

## 2018-06-06 RX ADMIN — PROPOFOL 150 MCG/KG/MIN: 10 INJECTION, EMULSION INTRAVENOUS at 07:41

## 2018-06-06 RX ADMIN — PROPOFOL 150 MG: 10 INJECTION, EMULSION INTRAVENOUS at 07:37

## 2018-06-06 RX ADMIN — FENTANYL CITRATE 25 MCG: 50 INJECTION, SOLUTION INTRAMUSCULAR; INTRAVENOUS at 08:27

## 2018-06-06 RX ADMIN — MIDAZOLAM 2 MG: 1 INJECTION INTRAMUSCULAR; INTRAVENOUS at 07:28

## 2018-06-06 RX ADMIN — PROPOFOL 50 MG: 10 INJECTION, EMULSION INTRAVENOUS at 08:01

## 2018-06-06 RX ADMIN — OXYCODONE HYDROCHLORIDE AND ACETAMINOPHEN 1 TABLET: 5; 325 TABLET ORAL at 10:15

## 2018-06-06 RX ADMIN — FENTANYL CITRATE 25 MCG: 50 INJECTION, SOLUTION INTRAMUSCULAR; INTRAVENOUS at 08:01

## 2018-06-06 RX ADMIN — SODIUM CHLORIDE 200 ML: 900 IRRIGANT IRRIGATION at 08:45

## 2018-06-06 RX ADMIN — CEFAZOLIN SODIUM 2 G: 2 INJECTION, SOLUTION INTRAVENOUS at 08:31

## 2018-06-06 NOTE — IP AVS SNAPSHOT
MRN:[id number]                      After Visit Summary   6/6/2018    Nori Lazaro    MRN: [id number]           Thank you!     Thank you for choosing [facility] for your care. Our goal is always to provide you with excellent care. Hearing back from our patients is one way we can continue to improve our services. Please take a few minutes to complete the written survey that you may receive in the mail after you visit with us. Thank you!        Patient Information     Date Of Birth          1965        About your hospital stay     You were admitted on:  June 6, 2018 You last received care in the:  [facility] OR    You were discharged on:  June 6, 2018       Who to Call     For medical emergencies, please call 541.  For non-urgent questions about your medical care, please call your primary care provider or clinic, [phone]  For questions related to your surgery, please call your surgery clinic        Attending Provider     Provider Specialty    Leo Bar MD Orthopedics       Primary Care Provider Office Phone # Fax #    Liliana Colindres [phone]      After Care Instructions     Discharge Instructions       SAME DAY SURGERY POST OPERATIVE DISCHARGE INSTRUCTIONS    FOLLOW UP APPOINTMENT  Please call [phone], option #1, during weekday business hours 8 am - 4:30 pm to schedule your follow up appointment. If you had a biopsy or soft tissue excisions, schedule your appointment two weeks after surgery. If you had an arthroscopic procedure or core decompression, schedule your appointment four weeks after surgery.    Your follow up appointments will be at the location that you regularly see Dr. Bar:    [facility]  [address] [place] [id number]  [phone]      ACTIVITY  You may participate in activities as tolerated     Exercises:   Perform the following exercises at least three times per day  for the first two weeks after surgery to prevent complications, such as blood clots in your legs:  1) Point and flex your feet  2) Move your ankle around in big circles  3) Wiggle your toes   Also, perform thigh muscle tightening exercises for 10 to 15 minutes at least three times per day for the first four weeks after surgery.    Athletic Activities:  Activities such as swimming, bicycling, jogging, running, and stop-and-go sports should be avoided until permitted by your provider.    Driving:  Driving is not permitted until directed by your provider. Under no circumstance are you permitted to drive while using narcotic pain medications.    Return to Work:  You may return to work when directed by your provider.       COMFORT AND PAIN MANAGEMENT  Elevation:   During times of inactivity throughout the first two weeks after surgery, make an effort to decrease swelling by elevating your operative extremity.     Icing:  An ice pack will be provided to control swelling and discomfort after surgery. Place a thin towel on your skin and apply the ice pack overtop. You may apply ice for 20 minutes as often as two times per hour.    Pain Medications:  You will be discharged with acetaminophen (Tylenol) and a narcotic medication for pain management after surgery. Acetaminophen can help to effectively manage pain when used as prescribed, however, do not exceed the maximum daily dose of 3000 mg. The narcotic pain medication should be reserved for severe, breakthrough pain. Take the narcotic medication as prescribed and use only as often as necessary.       ANTICOAGULATION  Depending on your risk factors, your provider may prescribe a daily aspirin. Take as directed.      WOUND CARE AND SHOWERING  Wound care:  Leave the initial dressing on for at least three days after surgery. If the incision is leaking any fluid or blood through or around the bandage, remove the existing bandage and replace it with gauze and tape. Be sure to  wash your hands before and after and use gloves if available. A bandage is usually only necessary for three days, but it is best to keep it on for five days if possible. The incision was closed with a clear knotless suture, and tails were left exposed at the ends of the incision. The suture tails (looks like a clear fishing line) will be clipped flush with the skin using clean scissors or a nail clipper at your follow up appointment.    Showering:  You may begin taking showers on the third day after surgery as long as the incision is dry. If there is drainage from the incision, cover the site with gauze and tape and keep it dry while showering. If there is not drainage, getting the wound wet while showering is acceptable. To dry, gently pat the incision dry and reapply dressing as needed.     Tub Bathing:  Tub bathing, swimming, or any other activities that cause your incision to be submerged should be avoided until allowed by your provider. Typically, patients are allowed to return to these activities four weeks after surgery.      DIET  Move to a regular diet as you are able and be sure to drink plenty of fluids. If you feel nauseous or sick to your stomach, drink only clear liquids such as apple juice, ginger ale, broth, or 7-UP.      RECOMMENDATIONS FOR A SUCCESSFUL RECOVERY  A responsible adult should accompany you for the first 24 hours after surgery. Avoid strenuous or risky activities and ask for assistance climbing stairs. If you feel lightheaded during your recovery, sit down for a few minutes and ask someone for help before getting up. Get plenty of rest after surgery and avoid alcohol. Do not make important legal decisions during your recovery.       CONTACTING YOUR PHYSICIAN:  You may experience symptoms that require follow-up before your scheduled appointment. Please contact Dr. Bar s office if you experience:  1) Signs of infection such as fever (temperature >100.4 F or 38 C), growing tenderness at  the surgery site, a large amount of drainage or bleeding, severe pain, foul-smelling drainage, redness, and/or swelling.  2) If it has been over 8-10 hours since surgery, and you still have not urinated (or passed water).   3) Headache for over 24 hours.   4) Numbness, tingling, or weakness the day after surgery (if you had spinal anesthesia).       Regular business hours (Monday - Friday, 8am - 5pm):  [facility]: [phone]    If you have any other concerns during normal business hours, please contact Dr. Bar's nurse, Pojoa Gay RN, at [phone] during normal business hours 8 am - 5 pm (leave message as needed). If your concern is urgent, please page Pooja Gay RN at [phone] and key in your 10 digit phone number followed by the # sign after the beep.     After hours and weekends:  [facility] on call Orthopedic resident: [phone]    If you have an emergent concern, contact the Emergency Department at the [place] - [phone] - or [place] - [phone] - [facility].            Discharge Instructions       SAME DAY SURGERY POST OPERATIVE DISCHARGE INSTRUCTIONS    FOLLOW UP APPOINTMENT  Please call [phone], option #1, during weekday business hours 8 am - 4:30 pm to schedule your follow up appointment. If you had a biopsy or soft tissue excisions, schedule your appointment two weeks after surgery.    Your follow up appointments will be at the location that you regularly see Dr. Bar:    [address] [address]  [phone]      ACTIVITY  You may participate in activities as tolerated     Exercises:   Perform the following exercises at least three times per day for the first two weeks after surgery to prevent complications, such as blood clots in your legs:  1) Point and flex your feet  2) Move your ankle around in big circles  3) Wiggle your  toes   Also, perform thigh muscle tightening exercises for 10 to 15 minutes at least three times per day for the first four weeks after surgery.    Athletic Activities:  Activities such as swimming, bicycling, jogging, running, and stop-and-go sports should be avoided until permitted by your provider.    Driving:  Driving is not permitted until directed by your provider. Under no circumstance are you permitted to drive while using narcotic pain medications.    Return to Work:  You may return to work when directed by your provider.       COMFORT AND PAIN MANAGEMENT  Elevation:   During times of inactivity throughout the first two weeks after surgery, make an effort to decrease swelling by elevating your operative extremity.     Icing:  An ice pack will be provided to control swelling and discomfort after surgery. Place a thin towel on your skin and apply the ice pack overtop. You may apply ice for 20 minutes as often as two times per hour.    Pain Medications:  You will be discharged with acetaminophen (Tylenol) and a narcotic medication for pain management after surgery. Acetaminophen can help to effectively manage pain when used as prescribed, however, do not exceed the maximum daily dose of 3000 mg. The narcotic pain medication should be reserved for severe, breakthrough pain. Take the narcotic medication as prescribed and use only as often as necessary.       WOUND CARE AND SHOWERING  Wound care:  Leave the initial dressing on for at least three days after surgery. If the incision is leaking any fluid or blood through or around the bandage, remove the existing bandage and replace it with gauze and tape. Be sure to wash your hands before and after and use gloves if available. A bandage is usually only necessary for three days, but it is best to keep it on for five days if possible. The incision was closed with a clear knotless suture, and tails were left exposed at the ends of the incision. The suture tails (looks  like a clear fishing line) will be clipped flush with the skin using clean scissors or a nail clipper at your follow up appointment.    Showering:  You may begin taking showers on the third day after surgery as long as the incision is dry. If there is drainage from the incision, cover the site with gauze and tape and keep it dry while showering. If there is not drainage, getting the wound wet while showering is acceptable. To dry, gently pat the incision dry and reapply dressing as needed.     Tub Bathing:  Tub bathing, swimming, or any other activities that cause your incision to be submerged should be avoided until allowed by your provider. Typically, patients are allowed to return to these activities four weeks after surgery.      DIET  Move to a regular diet as you are able and be sure to drink plenty of fluids. If you feel nauseous or sick to your stomach, drink only clear liquids such as apple juice, ginger ale, broth, or 7-UP.      RECOMMENDATIONS FOR A SUCCESSFUL RECOVERY  A responsible adult should accompany you for the first 24 hours after surgery. Avoid strenuous or risky activities and ask for assistance climbing stairs. If you feel lightheaded during your recovery, sit down for a few minutes and ask someone for help before getting up. Get plenty of rest after surgery and avoid alcohol. Do not make important legal decisions during your recovery.       CONTACTING YOUR PHYSICIAN:  You may experience symptoms that require follow-up before your scheduled appointment. Please contact Dr. Bar's office if you experience:  1) Signs of infection such as fever (temperature >100.4 F or 38 C), growing tenderness at the surgery site, a large amount of drainage or bleeding, severe pain, foul-smelling drainage, redness, and/or swelling.  2) If it has been over 8-10 hours since surgery, and you still have not urinated (or passed water).   3) Headache for over 24 hours.   4) Numbness, tingling, or weakness the day after  surgery (if you had spinal anesthesia).       Regular business hours (Monday - Friday, 8am - 5pm):  [facility]: [phone]    If you have any other concerns during normal business hours, please contact Dr. Bar's nurse, Pooja Gay RN, at [phone] during normal business hours 8 am - 5 pm (leave message as needed). If your concern is urgent, please page Pooja Gay RN at [phone] and key in your 10 digit phone number followed by the # sign after the beep.     After hours and weekends:  [facility] on call Orthopedic resident: [phone]    If you have an emergent concern, contact the Emergency Department at the [facility] - [phone] - or [place] - [phone] - [place].            No Alcohol       While using narcotic medication            No Alcohol       While using narcotic medication            No driving or operating machinery       While using narcotic medication            No driving or operating machinery       While using narcotic medication            Weight bearing - As tolerated                 Your next 10 appointments already scheduled     Aug 20, 2018  7:30 AM CDT   (Arrive by 7:15 AM)   New Patient Visit with Daniel Gallegos MD   [facility] ([facility])    [address]  [address] [place] [id number] [phone]              Further instructions from your care team       Same-Day Surgery   Adult Discharge Orders & Instructions     For 24 hours after surgery:  1. Get plenty of rest.  A responsible adult must stay with you for at least 24 hours after you leave the hospital.   2. Pain medication can slow your reflexes. Do not drive or use heavy equipment.  If you have weakness or tingling, don't drive or use heavy equipment until this feeling goes away.  3. Mixing alcohol and pain medication can cause dizziness and slow your breathing. It can even be fatal. Do  not drink alcohol while taking pain medication.  4. Avoid strenuous or risky activities.  Ask for help when climbing stairs.   5. You may feel lightheaded.  If so, sit for a few minutes before standing.  Have someone help you get up.   6. If you have nausea (feel sick to your stomach), drink only clear liquids such as apple juice, ginger ale, broth or 7-Up.  Rest may also help.  Be sure to drink enough fluids.  Move to a regular diet as you feel able. Take pain medications with a small amount of solid food, such as toast or crackers, to avoid nausea.   7. A slight fever is normal. Call the doctor if your fever is over 100 F (37.7 C) (taken under the tongue) or lasts longer than 24 hours.  8. You may have a dry mouth, muscle aches, trouble sleeping or a sore throat.  These symptoms should go away after 24 hours.  9. Do not make important or legal decisions.   Pain Management:      1. Take pain medication (if prescribed) for pain as directed by your physician.        2. WARNING: If the pain medication you have been prescribed contains Tylenol  (acetaminophen), DO NOT take additional doses of Tylenol (acetaminophen).     Call your doctor for any of the followin.  Signs of infection (fever, growing tenderness at the surgery site, severe pain, a large amount of drainage or bleeding, foul-smelling drainage, redness, swelling).    2.  It has been over 8 to 10 hours since surgery and you are still not able to urinate (pee).    3.  Headache for over 24 hours.    4.  Numbness, tingling or weakness the day after surgery (if you had spinal anesthesia).  To contact a doctor, call _____________________________________ or:      [phone] and ask for the Resident On Call for:         Ortho _ (answered 24 hours a day)      Emergency Department:  [facility]: [phone]  [facility]: [phone]               Rev. 10/2014       Pending Results     Date and Time Order Name Status  "Description    6/6/2018 0830 Tissue Culture Aerobic Bacterial In process     6/6/2018 0830 Anaerobic bacterial culture In process     6/6/2018 0807 Surgical pathology exam Preliminary             Admission Information     Date & Time Provider Department Dept. Phone    6/6/2018 Leo Bar MD  MAIN [phone]      Your Vitals Were     Blood Pressure Temperature Respirations Height Weight Pulse Oximetry    111/75 97.5  F (36.4  C) (Oral) 16 1.626 m (5' 4\") 64.2 kg (141 lb 8.6 oz) 100%    BMI (Body Mass Index)                   24.29 kg/m2           MyChart Information     [facility] gives you secure access to your electronic health record. If you see a primary care provider, you can also send messages to your care team and make appointments. If you have questions, please call your primary care clinic.  If you do not have a primary care provider, please call [phone] and they will assist you.        Care EveryWhere ID     This is your Care EveryWhere ID. This could be used by other organizations to access your [facility] medical records  [id number]        Equal Access to Services     [facility] Center[facility] : Hadii milena Guzman, wageorgeda jose, qaybta kaalrowena hyde, sammy rico. So [facility] [phone].    ATENCIÓN: Si habla español, tiene a uriarte disposición servicios gratuitos de asistencia lingüística. Llame al [phone].    We comply with applicable federal civil rights laws and [place] laws. We do not discriminate on the basis of race, color, national origin, age, disability, sex, sexual orientation, or gender identity.               Review of your medicines      START taking        Dose / Directions    HYDROcodone-acetaminophen 5-325 MG per tablet   Commonly known as:  NORCO   Used for:  Left knee pain, unspecified chronicity        Dose:  1-2 tablet   Take 1-2 tablets by mouth every 4 hours as needed for other (Moderate to Severe Pain)   Quantity:  20 tablet "   Refills:  0       oxyCODONE-acetaminophen 5-325 MG per tablet   Commonly known as:  PERCOCET   Used for:  Left knee pain, unspecified chronicity        Dose:  1-2 tablet   Take 1-2 tablets by mouth every 4 hours as needed for pain (moderate to severe)   Quantity:  12 tablet   Refills:  0         CONTINUE these medicines which have NOT CHANGED        Dose / Directions    ALGAL OMEGA-3  MG capsule   Generic drug:  Docosahexaenoic Acid        daily   Refills:  0       cholecalciferol 1000 UNIT tablet   Commonly known as:  vitamin D3        daily   Refills:  0       Glucosamine Sulfate 750 MG Tabs        daily   Refills:  0       METHYL B-12 1000 MCG Lozg   Generic drug:  Methylcobalamin        daily   Refills:  0       UNABLE TO FIND        Calm Magnesium- 2 teaspoons daily   Refills:  0            Where to get your medicines      Some of these will need a paper prescription and others can be bought over the counter. Ask your nurse if you have questions.     Bring a paper prescription for each of these medications     HYDROcodone-acetaminophen 5-325 MG per tablet    oxyCODONE-acetaminophen 5-325 MG per tablet                Protect others around you: Learn how to safely use, store and throw away your medicines at www.disposemymeds.org.        Information about OPIOIDS     PRESCRIPTION OPIOIDS: WHAT YOU NEED TO KNOW   You have a prescription for an opioid (narcotic) pain medicine. Opioids can cause addiction. If you have a history of chemical dependency of any type, you are at a higher risk of becoming addicted to opioids. Only take this medicine after all other options have been tried. Take it for as short a time and as few doses as possible.     Do not:    Drive. If you drive while taking these medicines, you could be arrested for driving under the influence (DUI).    Operate heavy machinery    Do any other dangerous activities while taking these medicines.     Drink any alcohol while taking these medicines.       Take with any other medicines that contain acetaminophen. Read all labels carefully. Look for the word  acetaminophen  or  Tylenol.  Ask your pharmacist if you have questions or are unsure.    Store your pills in a secure place, locked if possible. We will not replace any lost or stolen medicine. If you don t finish your medicine, please throw away (dispose) as directed by your pharmacist. The [place] Pollution Control [employer] has more information about safe disposal: https://www.pca.[facility].[place].us/living-green/managing-unwanted-medications    All opioids tend to cause constipation. Drink plenty of water and eat foods that have a lot of fiber, such as fruits, vegetables, prune juice, apple juice and high-fiber cereal. Take a laxative (Miralax, milk of magnesia, Colace, Senna) if you don t move your bowels at least every other day.              Medication List: This is a list of all your medications and when to take them. Check marks below indicate your daily home schedule. Keep this list as a reference.      Medications           Morning Afternoon Evening Bedtime As Needed    ALGAL OMEGA-3  MG capsule   daily   Generic drug:  Docosahexaenoic Acid                                cholecalciferol 1000 UNIT tablet   Commonly known as:  vitamin D3   daily                                Glucosamine Sulfate 750 MG Tabs   daily                                HYDROcodone-acetaminophen 5-325 MG per tablet   Commonly known as:  NORCO   Take 1-2 tablets by mouth every 4 hours as needed for other (Moderate to Severe Pain)                                METHYL B-12 1000 MCG Lozg   daily   Generic drug:  Methylcobalamin                                oxyCODONE-acetaminophen 5-325 MG per tablet   Commonly known as:  PERCOCET   Take 1-2 tablets by mouth every 4 hours as needed for pain (moderate to severe)                                UNABLE TO FIND   Calm Magnesium- 2 teaspoons daily

## 2018-06-06 NOTE — OP NOTE
Date of surgery June 6, 2018    Preoperative diagnosis mass of left knee    Postoperative diagnosis same    Procedure   #1 open biopsy of left knee mass, 2 cm, deep  2.  Synovial biopsy of left knee.      Surgeon: Leo Bar MD    Assistants: None    Anesthesia General endotracheal    Procedure:  Patient was placed on the operating table supine position after induction of general anesthesia left lower extremity was prepped and draped in sterile manner.  A 2 cm mass alongside the deep subfascial layer at the left medial knee was identified by palpation.  A longitudinal incision directly over the mass was made.  I dissected through the cutaneous tissue and opened the fascia overlying the mass.  Using palpation as a guide I dissected around the mass.  I  it from the surrounding T tissue along the medial epicondyle of the knee joint.  I elevated it from the femur.  The tumor was delivered into the wound.  It was extracted completely with no gross tumor remaining.    Wound closure was accomplished with 2-0 Vicryl suture for the deep layers and 3-0 PDS suture the skin.  A sterile dressing was applied.    Using separate instruments and a clean set up as well as a clean separate prep, the surgical team changed gowns and gloves.  A synovial biopsy was then planned given the MRI appearance of her knee joint and a borderline positive MAGALY.  I proceeded with making a decision on the superior lateral aspect of the knee with a #11 blade.  I then placed a [place] pituitary rongeur into the knee joint.  Approximately 5-6 samples of synovium were then obtained from different parts of the knee joint for complete sampling.  Synovial tissue piece was sent for culture examination as well.  Perioperative prophylactic antibiotics were given thereafter.  Wound was then closed with absorbable suture and a sterile dressing was applied.    Leo Bar MD  [facility] Professor  Oncology and Adult Reconstructive Surgery  Dept  Orthopaedic Surgery, [facility]  [phone] office, [phone] pager  [email or web address]

## 2018-06-06 NOTE — ANESTHESIA POSTPROCEDURE EVALUATION
Patient: Nori Lazaro    Procedure(s):  Excision Biopsy Of Left Knee, Synovial Biopsy Left Knee - Wound Class: I-Clean   - Wound Class: I-Clean    Diagnosis:Left Knee Mass   Diagnosis Additional Information: No value filed.    Anesthesia Type:  General, LMA    Note:  Anesthesia Post Evaluation    Patient location during evaluation: PACU  Patient participation: Able to fully participate in evaluation  Level of consciousness: awake and alert  Pain management: adequate  Airway patency: patent  Cardiovascular status: acceptable  Respiratory status: acceptable  Hydration status: acceptable  PONV: none     Anesthetic complications: None          Last vitals:  Vitals:    06/06/18 0945 06/06/18 1000 06/06/18 1015   BP: 111/75 118/78 115/86   Resp: 16 12 16   Temp:   36.5  C (97.7  F)   SpO2: 100% 100% 97%         Electronically Signed By: Nickie Armando MD  June 6, 2018  10:33 AM

## 2018-06-06 NOTE — IP AVS SNAPSHOT
MAIN OR    [address]    [place] [address]    Phone:  [phone]                                       After Visit Summary   6/6/2018    Nori Lazaro    MRN: [id number]           After Visit Summary Signature Page     I have received my discharge instructions, and my questions have been answered. I have discussed any challenges I see with this plan with the nurse or doctor.    ..........................................................................................................................................  Patient/Patient Representative Signature      ..........................................................................................................................................  Patient Representative Print Name and Relationship to Patient    ..................................................               ................................................  Date                                            Time    ..........................................................................................................................................  Reviewed by Signature/Title    ...................................................              ..............................................  Date                                                            Time

## 2018-06-06 NOTE — ANESTHESIA CARE TRANSFER NOTE
Patient: Nori Lazaro    Procedure(s):  Excision Biopsy Of Left Knee, Synovial Biopsy Left Knee - Wound Class: I-Clean   - Wound Class: I-Clean    Diagnosis: Left Knee Mass   Diagnosis Additional Information: No value filed.    Anesthesia Type:   General, LMA     Note:  Airway :Face Mask  Patient transferred to:PACU  Handoff Report: Identifed the Patient, Identified the Reponsible Provider, Reviewed the pertinent medical history, Discussed the surgical course, Reviewed Intra-OP anesthesia mangement and issues during anesthesia, Set expectations for post-procedure period and Allowed opportunity for questions and acknowledgement of understanding      Vitals: (Last set prior to Anesthesia Care Transfer)    CRNA VITALS  6/6/2018 0814 - 6/6/2018 0855      6/6/2018             Pulse: 68    SpO2: 97 %    Resp Rate (observed): 15                Electronically Signed By: Jess Barton MD  June 6, 2018  8:55 AM

## 2018-06-06 NOTE — DISCHARGE INSTRUCTIONS
Same-Day Surgery   Adult Discharge Orders & Instructions     For 24 hours after surgery:  1. Get plenty of rest.  A responsible adult must stay with you for at least 24 hours after you leave the hospital.   2. Pain medication can slow your reflexes. Do not drive or use heavy equipment.  If you have weakness or tingling, don't drive or use heavy equipment until this feeling goes away.  3. Mixing alcohol and pain medication can cause dizziness and slow your breathing. It can even be fatal. Do not drink alcohol while taking pain medication.  4. Avoid strenuous or risky activities.  Ask for help when climbing stairs.   5. You may feel lightheaded.  If so, sit for a few minutes before standing.  Have someone help you get up.   6. If you have nausea (feel sick to your stomach), drink only clear liquids such as apple juice, ginger ale, broth or 7-Up.  Rest may also help.  Be sure to drink enough fluids.  Move to a regular diet as you feel able. Take pain medications with a small amount of solid food, such as toast or crackers, to avoid nausea.   7. A slight fever is normal. Call the doctor if your fever is over 100 F (37.7 C) (taken under the tongue) or lasts longer than 24 hours.  8. You may have a dry mouth, muscle aches, trouble sleeping or a sore throat.  These symptoms should go away after 24 hours.  9. Do not make important or legal decisions.   Pain Management:      1. Take pain medication (if prescribed) for pain as directed by your physician.        2. WARNING: If the pain medication you have been prescribed contains Tylenol  (acetaminophen), DO NOT take additional doses of Tylenol (acetaminophen).     Call your doctor for any of the followin.  Signs of infection (fever, growing tenderness at the surgery site, severe pain, a large amount of drainage or bleeding, foul-smelling drainage, redness, swelling).    2.  It has been over 8 to 10 hours since surgery and you are still not able to urinate (pee).    3.   Headache for over 24 hours.    4.  Numbness, tingling or weakness the day after surgery (if you had spinal anesthesia).  To contact a doctor, call _____________________________________ or:      [phone] and ask for the Resident On Call for:         Ortho _ (answered 24 hours a day)      Emergency Department:  [facility]: [phone]  [place] [facility]: [phone]               Rev. 10/2014

## 2018-06-08 LAB — COPATH REPORT: NORMAL

## 2018-06-11 LAB
BACTERIA SPEC CULT: NO GROWTH
SPECIMEN SOURCE: NORMAL

## 2018-06-13 ENCOUNTER — TELEPHONE (OUTPATIENT)
Dept: ORTHOPEDICS | Facility: CLINIC | Age: 53
End: 2018-06-13

## 2018-06-13 LAB
BACTERIA SPEC CULT: NORMAL
Lab: NORMAL
SPECIMEN SOURCE: NORMAL

## 2018-06-13 ASSESSMENT — ENCOUNTER SYMPTOMS
MYALGIAS: 0
JOINT SWELLING: 1
MUSCLE WEAKNESS: 0
MUSCLE CRAMPS: 0
ARTHRALGIAS: 0
STIFFNESS: 0
BACK PAIN: 0
NECK PAIN: 0

## 2018-06-13 NOTE — TELEPHONE ENCOUNTER
Luiza called with some concerns about her left knee incision.  She reports that on the outer part of her knee there is a hard, painful lump.  She is due for a post-op check on Monday 6/18/18.  I offered her an appointment tomorrow and 12 noon for a wound assessment.  She will come to that appt.

## 2018-06-14 ENCOUNTER — OFFICE VISIT (OUTPATIENT)
Dept: ORTHOPEDICS | Facility: CLINIC | Age: 53
End: 2018-06-14
Payer: COMMERCIAL

## 2018-06-14 DIAGNOSIS — M17.12 PRIMARY OSTEOARTHRITIS OF LEFT KNEE: ICD-10-CM

## 2018-06-14 DIAGNOSIS — D36.10 SCHWANNOMA: Primary | ICD-10-CM

## 2018-06-14 NOTE — LETTER
"6/14/2018       RE: Christie Dick  [address] [facility] [address]     Dear Colleague,    Thank you for referring your patient, Christie Dick, to the [facility] at [facility]. Please see a copy of my visit note below.        [facility], Orthopaedic Oncology Surgery Consultation  by Leo Bar M.D.    Christie Dick MRN# [id number]   Age: 52 year old YOB: 1965     Requesting physician: Angela Mccoy     DX:  1. Schwannoma L medial knee soft tissue  2. R/o RA. Medial meniscal degeneration L knee      SURGERY:  1. 6/6/18, Excision of medial knee schwannoma, L knee synovial biopsy ([facility]os[facility]) [facility]      Copath Report 06/06/2018  8:05 AM 88      Patient Name: CHRISTIE DICK   MR#: [id number]   Specimen #: [id number]   Collected: 6/6/2018   Received: 6/6/2018   Reported: 6/8/2018 17:44   Ordering Phy(s): LEO BAR     For improved result formatting, select 'View Enhanced Report Format' under    Linked Documents section.     ORIGINAL REPORT:     SPECIMEN(S):   A: Medial left knee   B: Left knee synovium biopsy     FINAL DIAGNOSIS:   A. Soft tissue, medial left knee, excision:   - Schwannoma     B. Left knee synovium biopsy:   - Synovial lipomatosis     I have personally reviewed all specimens and/or slides, including the   listed special stains, and used them   with my medical judgement to determine or confirm the final diagnosis.     Electronically signed out by:     Brielle Snow M.D., [facility]     CLINICAL HISTORY:   53-year-old female with a painful ovoid soft tissue mass measuring 2.6 x   1.8 x 1.0 cm identified beneath the   pedis tendons and the posterior lateral aspect of her medial femoral   condyle of the left knee.  Procedure:   Excisional biopsy and synovial biopsy.     GROSS:   A:  The specimen is received fresh with proper patient identification,   labeled \"left medial " "knee\".  The   specimen consists of a yellow-tan soft tissue measuring 2.6 x 1.5 x 1.2   cm.  The specimen is serially   sectioned and a representative section is submitted for frozen section and    resubmitted for permanent section   in cassette A1FS.  The remainder of the specimen is submitted entirely in   cassettes A2-A3. (Dictated by:   Kim Lazaro 6/6/2018 01:23 PM)     B:  The specimen is received in formalin with proper patient   identification, labeled \"left knee synovium   biopsy\".  The specimen consists of multiple tan-yellow soft tissues   measuring 1.9 x 1.5 x 0.5 cm in aggregate.    The specimen is submitted entirely in cassette B1. (Dictated by: Kim Lazaro 6/7/2018 10:05 AM)     INTRAOPERATIVE CONSULTATION:   Frozen section is performed on part A. Please refer to Epic Result History    for the Preliminary Intraoperative   Diagnosis.     MICROSCOPIC:   Microscopic examination is performed.     CPT Codes:   A: 99966-EV2, 56513-KI   B: 67009-LJ4     TESTING LAB LOCATION:   [address] [address] [phone]     COLLECTION SITE:   Client: [facility]   Location: UROR (B)     Resident   [email or web address]       Nori returns to see me after the above-mentioned surgery.  She is doing well and her incisions are healing nicely.  She can fully extend the knee and has further flexion to 100 .    I reviewed her pathology report which is noted above and there is evidence of a schwannoma.  I explained that this is a benign tumor and does not require further intervention.  It may be responsible for the electric type shock sensations that she was having the anterior tibial knee area.  It would not account for the symptoms in her foot.    I did also perform a synovial biopsy which did not reveal evidence of rheumatoid arthritis.  Nonetheless I do not think it rules out this diagnosis " either.  She has a consultation with a rheumatologist in the near future.  I think this is appropriate.    In regards to her left knee, she may still continue to have pain and discomfort as there definitely is a synovitis present.  Her pain is improved when she reduces her activity level and therefore I think she will need to consider modifications to activity on a more long-term basis.    Next follow-up visit will be in 1 years time or sooner as needed.    Leo Bar MD  [facility] Family Professor  Oncology and Adult Reconstructive Surgery  Dept Orthopaedic Surgery, [facility]  [phone] office, [phone] pager  [email or web address]    Total Time = 15 min, 50% of which was spent in counseling and coordination of care as documented above.        Again, thank you for allowing me to participate in the care of your patient.      Sincerely,    Leo Bar MD

## 2018-06-14 NOTE — PROGRESS NOTES
"    [facility] Phys[facility], Orthopaedic Oncology Surgery Consultation  by Leo Bar M.D.    Christie Dick MRN# [id number]   Age: 52 year old YOB: 1965     Requesting physician: Angela Mccoy     DX:  1. Schwannoma L medial knee soft tissue  2. R/o RA. Medial meniscal degeneration L knee      SURGERY:  1. 6/6/18, Excision of medial knee schwannoma, L knee synovial biopsy ([facility]) [facility]      Copath Report 06/06/2018  8:05 AM 88      Patient Name: CHRISTIE DICK   MR#: [id number]   Specimen #: [id number]   Collected: 6/6/2018   Received: 6/6/2018   Reported: 6/8/2018 17:44   Ordering Phy(s): LEO BAR     For improved result formatting, select 'View Enhanced Report Format' under    Linked Documents section.     ORIGINAL REPORT:     SPECIMEN(S):   A: Medial left knee   B: Left knee synovium biopsy     FINAL DIAGNOSIS:   A. Soft tissue, medial left knee, excision:   - Schwannoma     B. Left knee synovium biopsy:   - Synovial lipomatosis     I have personally reviewed all specimens and/or slides, including the   listed special stains, and used them   with my medical judgement to determine or confirm the final diagnosis.     Electronically signed out by:     Brielle Snow M.D., [facility]     CLINICAL HISTORY:   53-year-old female with a painful ovoid soft tissue mass measuring 2.6 x   1.8 x 1.0 cm identified beneath the   pedis tendons and the posterior lateral aspect of her medial femoral   condyle of the left knee.  Procedure:   Excisional biopsy and synovial biopsy.     GROSS:   A:  The specimen is received fresh with proper patient identification,   labeled \"left medial knee\".  The   specimen consists of a yellow-tan soft tissue measuring 2.6 x 1.5 x 1.2   cm.  The specimen is serially   sectioned and a representative section is submitted for frozen section and    resubmitted for permanent section   in cassette A1FS.  The remainder of the specimen is " "submitted entirely in   cassettes A2-A3. (Dictated by:   Kim Lazaro 6/6/2018 01:23 PM)     B:  The specimen is received in formalin with proper patient   identification, labeled \"left knee synovium   biopsy\".  The specimen consists of multiple tan-yellow soft tissues   measuring 1.9 x 1.5 x 0.5 cm in aggregate.    The specimen is submitted entirely in cassette B1. (Dictated by: Kim Lazaro 6/7/2018 10:05 AM)     INTRAOPERATIVE CONSULTATION:   Frozen section is performed on part A. Please refer to Epic Result History    for the Preliminary Intraoperative   Diagnosis.     MICROSCOPIC:   Microscopic examination is performed.     CPT Codes:   A: 23099-EL1, 93575-UE   B: 05549-XQ0     TESTING LAB LOCATION:   [address] [address] [phone]     COLLECTION SITE:   Client: [facility]   Location: UROR (B)     Resident   [email or web address]       Nori returns to see me after the above-mentioned surgery.  She is doing well and her incisions are healing nicely.  She can fully extend the knee and has further flexion to 100 .    I reviewed her pathology report which is noted above and there is evidence of a schwannoma.  I explained that this is a benign tumor and does not require further intervention.  It may be responsible for the electric type shock sensations that she was having the anterior tibial knee area.  It would not account for the symptoms in her foot.    I did also perform a synovial biopsy which did not reveal evidence of rheumatoid arthritis.  Nonetheless I do not think it rules out this diagnosis either.  She has a consultation with a rheumatologist in the near future.  I think this is appropriate.    In regards to her left knee, she may still continue to have pain and discomfort as there definitely is a synovitis present.  Her pain is improved when she reduces her activity level " and therefore I think she will need to consider modifications to activity on a more long-term basis.    Next follow-up visit will be in 1 years time or sooner as needed.    Leo Bar MD  [facility] Family Professor  Oncology and Adult Reconstructive Surgery  Dept Orthopaedic Surgery, [facility]  [phone] office, [phone] pager  [email or web address]    Total Time = 15 min, 50% of which was spent in counseling and coordination of care as documented above.        Answers for HPI/ROS submitted by the patient on 6/13/2018   General Symptoms: No  Skin Symptoms: No  HENT Symptoms: No  EYE SYMPTOMS: No  HEART SYMPTOMS: No  LUNG SYMPTOMS: No  INTESTINAL SYMPTOMS: No  URINARY SYMPTOMS: No  GYNECOLOGIC SYMPTOMS: No  BREAST SYMPTOMS: No  SKELETAL SYMPTOMS: Yes  BLOOD SYMPTOMS: No  NERVOUS SYSTEM SYMPTOMS: No  MENTAL HEALTH SYMPTOMS: No  Back pain: No  Muscle aches: No  Neck pain: No  Swollen joints: Yes  Joint pain: No  Bone pain: No  Muscle cramps: No  Muscle weakness: No  Joint stiffness: No  Bone fracture: No

## 2018-06-14 NOTE — MR AVS SNAPSHOT
After Visit Summary   6/14/2018    Nori Lazaro    MRN: [id number]           Patient Information     Date Of Birth          1965        Visit Information        Provider Department      6/14/2018 12:00 PM Leo Bar MD [facility]        Today's Diagnoses     Schwannoma    -  1    Primary osteoarthritis of left knee           Follow-ups after your visit        Your next 10 appointments already scheduled     Jun 18, 2018  9:30 AM CDT   (Arrive by 9:15 AM)   Return Visit with Leo Bar MD   [facility] ([facility])    [address]  4th Floor  [facility] [address] [phone]            Aug 20, 2018  7:30 AM CDT   (Arrive by 7:15 AM)   New Patient Visit with Daniel Gallegos MD   [facility] Rhe[facility] ([facility])    [address]  [address]  [facility] [address] [phone]              Who to contact     Please call your clinic at [phone] to:    Ask questions about your health    Make or cancel appointments    Discuss your medicines    Learn about your test results    Speak to your doctor            Additional Information About Your Visit        MyChart Information     [employer] gives you secure access to your electronic health record. If you see a primary care provider, you can also send messages to your care team and make appointments. If you have questions, please call your primary care clinic.  If you do not have a primary care provider, please call [phone] and they will assist you.      [employer] is an electronic gateway that provides easy, online access to your medical records. With [employer], you can request a clinic appointment, read your test results, renew a prescription or communicate with your care team.     To access your existing account, please contact your [facility] or call [phone] for assistance.        Care  EveryWhere ID     This is your Care EveryWhere ID. This could be used by other organizations to access your [place] medical records  [id number]         Blood Pressure from Last 3 Encounters:   06/06/18 111/69   05/31/18 124/76   08/13/14 114/76    Weight from Last 3 Encounters:   06/06/18 64.2 kg (141 lb 8.6 oz)   05/31/18 65.5 kg (144 lb 6.4 oz)   05/31/18 65.5 kg (144 lb 6.4 oz)              Today, you had the following     No orders found for display       Primary Care Provider Office Phone # Fax #    Liliana Colindres [phone]       [facility] OB GYN CONSULT [address] [facility] [address]  [facility] [address]        Equal Access to Services     [facility] NURIA : Hadii aad ku hadasho Somarilee, waaxda luqadaha, qaybta kaalmada barrett, sammy joyce . So [facility] [phone].    ATENCIÓN: Si habla español, tiene a uriarte disposición servicios gratuitos de asistencia lingüística. Isabella al [phone].    We comply with applicable federal civil rights laws and [place] laws. We do not discriminate on the basis of race, color, national origin, age, disability, sex, sexual orientation, or gender identity.            Thank you!     Thank you for choosing [facility]  for your care. Our goal is always to provide you with excellent care. Hearing back from our patients is one way we can continue to improve our services. Please take a few minutes to complete the written survey that you may receive in the mail after your visit with us. Thank you!             Your Updated Medication List - Protect others around you: Learn how to safely use, store and throw away your medicines at www.disposemymeds.org.          This list is accurate as of 6/14/18 12:37 PM.  Always use your most recent med list.                   Brand Name Dispense Instructions for use Diagnosis    ALGAL OMEGA-3  MG capsule   Generic drug:  Docosahexaenoic Acid      daily        cholecalciferol  1000 UNIT tablet    vitamin D3     daily        Glucosamine Sulfate 750 MG Tabs      daily        HYDROcodone-acetaminophen 5-325 MG per tablet    NORCO    20 tablet    Take 1-2 tablets by mouth every 4 hours as needed for other (Moderate to Severe Pain)    Left knee pain, unspecified chronicity       METHYL B-12 1000 MCG Lozg   Generic drug:  Methylcobalamin      daily        oxyCODONE-acetaminophen 5-325 MG per tablet    PERCOCET    12 tablet    Take 1-2 tablets by mouth every 4 hours as needed for pain (moderate to severe)    Left knee pain, unspecified chronicity       UNABLE TO FIND      Calm Magnesium- 2 teaspoons daily

## 2018-07-30 ENCOUNTER — OFFICE VISIT (OUTPATIENT)
Dept: RHEUMATOLOGY | Facility: CLINIC | Age: 53
End: 2018-07-30
Attending: INTERNAL MEDICINE
Payer: COMMERCIAL

## 2018-07-30 VITALS
TEMPERATURE: 97.6 F | WEIGHT: 141.6 LBS | HEART RATE: 71 BPM | SYSTOLIC BLOOD PRESSURE: 125 MMHG | HEIGHT: 64 IN | DIASTOLIC BLOOD PRESSURE: 64 MMHG | BODY MASS INDEX: 24.17 KG/M2 | OXYGEN SATURATION: 100 %

## 2018-07-30 DIAGNOSIS — M25.562 LEFT KNEE PAIN, UNSPECIFIED CHRONICITY: Primary | ICD-10-CM

## 2018-07-30 DIAGNOSIS — M25.562 LEFT KNEE PAIN, UNSPECIFIED CHRONICITY: ICD-10-CM

## 2018-07-30 LAB — CRP SERPL-MCNC: <2.9 MG/L (ref 0–8)

## 2018-07-30 PROCEDURE — 36415 COLL VENOUS BLD VENIPUNCTURE: CPT | Performed by: INTERNAL MEDICINE

## 2018-07-30 PROCEDURE — 86200 CCP ANTIBODY: CPT | Performed by: INTERNAL MEDICINE

## 2018-07-30 PROCEDURE — 86140 C-REACTIVE PROTEIN: CPT | Performed by: INTERNAL MEDICINE

## 2018-07-30 PROCEDURE — G0463 HOSPITAL OUTPT CLINIC VISIT: HCPCS | Mod: ZF

## 2018-07-30 ASSESSMENT — PAIN SCALES - GENERAL: PAINLEVEL: NO PAIN (0)

## 2018-07-30 NOTE — PROGRESS NOTES
[facility]  Rheumatology Clinic  Daniel Gallegos MD  2018     Name: Nori Lazaro  MRN: [id number]  Age: 53 year old  : 1965  Referring provider: Leo Bar     Assessment and Plan:  There are no diagnoses linked to this encounter.      Follow-up: Data Unavailable     HPI:   Nori Lazaro is a 53 year old female with a history of *** who presents for evaluation of   ***.       The patient has been following with Dr. Bar of orthopedics, who performed a synovial biopsy, which did not reveal evidence of rheumatoid arthritis. She did however have a borderline positive MAGALY.     Review of Systems:   Pertinent items are noted in HPI, remainder of complete ROS is negative.      Active Medications:      cholecalciferol (VITAMIN D3) 1000 UNIT tablet, daily , Disp: , Rfl:      Docosahexaenoic Acid (ALGAL OMEGA-3 DHA) 200 MG capsule, daily , Disp: , Rfl:      Glucosamine Sulfate 750 MG TABS, daily , Disp: , Rfl:      HYDROcodone-acetaminophen (NORCO) 5-325 MG per tablet, Take 1-2 tablets by mouth every 4 hours as needed for other (Moderate to Severe Pain), Disp: 20 tablet, Rfl: 0     Methylcobalamin (METHYL B-12) 1000 MCG LOZG, daily , Disp: , Rfl:      oxyCODONE-acetaminophen (PERCOCET) 5-325 MG per tablet, Take 1-2 tablets by mouth every 4 hours as needed for pain (moderate to severe), Disp: 12 tablet, Rfl: 0     UNABLE TO FIND, Calm Magnesium- 2 teaspoons daily, Disp: , Rfl:       Allergies:   The patient reports no known allergies.      Past Medical History:  Endometrial polyp  Basal cell skin cancer   Hyperlipidemia   Depression   Bilateral knee pain   Right hip pain     Past Surgical History:  Skin lesion biopsy  Left knee synovium biopsy  Hamstring tear/reattachment   Dilation and curettage, hysteroscopy     Family History:   Father: hypertension, arthritis, alcoholism   Mother: breast cancer, substance abuse, anesthesia reaction, neurologic disorder, hyperlipidemia, osteoporosis,  migraines  Paternal grandmother: diabetes mellitus, arthritis, heart disease, hyperlipidemia  Maternal grandmother: eye disorder, heart disease, osteoporosis  Sister: depression   Maternal grandfather: depression, heart disease, psychotic disorder, lung cancer       Social History:   The patient has no history of tobacco use.   The patient endorses alcohol consumption, 1 drink per week.      Physical Exam:   There were no vitals taken for this visit.   Wt Readings from Last 4 Encounters:   06/06/18 64.2 kg (141 lb 8.6 oz)   05/31/18 65.5 kg (144 lb 6.4 oz)   05/31/18 65.5 kg (144 lb 6.4 oz)   04/30/18 64.4 kg (141 lb 14.4 oz)     Constitutional: Well-developed, appearing stated age; cooperative  Eyes: Normal EOM, PERRLA, vision, conjunctiva, sclera  ENT: Normal external ears, nose, hearing, lips, teeth, gums, throat. No mucous membrane lesions, normal saliva pool  Neck: No mass or thyroid enlargement  Resp: Lungs clear to auscultation, nl to palpation  CV: RRR, no murmurs, rubs or gallops, no edema  GI: No ABD mass or tenderness, no HSM  : Not tested  Lymph: No cervical, supraclavicular, inguinal or epitrochlear nodes  MS: The TMJ, neck, shoulder, elbow, wrist, MCP/PIP/DIP, spine, hip, knee, ankle, and foot MTP/IP joints were examined and found normal. No active synovitis or altered joint anatomy. Full joint ROM. Normal  strength. No dactylitis,  tenosynovitis, enthesopathy.  Skin: No nail pitting, alopecia, rash, nodules or lesions  Neuro: Normal cranial nerves, strength, sensation, DTRs.   Psych: Normal judgement, orientation, memory, affect.  ***    Laboratory:   Component      Latest Ref Rng & Units 4/30/2018             WBC      4.0 - 11.0 10e9/L 4.0   RBC Count      3.8 - 5.2 10e12/L 5.16   Hemoglobin      11.7 - 15.7 g/dL 15.6   Hematocrit      35.0 - 47.0 % 46.5   MCV      78 - 100 fl 90   MCH      26.5 - 33.0 pg 30.2   MCHC      31.5 - 36.5 g/dL 33.5   RDW      10.0 - 15.0 % 12.8   Platelet Count       150 - 450 10e9/L 206   Body Fluid Analysis Source       Left Knee   Color Fluid       Yellow   Appearance Fluid       Slightly Cloudy   WBC Fluid      /uL 143   % Lymphocytes Fluid      % 4   % Mono/Macro Fluid      % 96   MAGALY interpretation      NEG:Negative Borderline Positive (A)   MAGALY pattern 1       SPECKLED   MAGALY titer 1       1:80   Specimen Description          Special Requests          Culture Micro          Crystal Analysis      NOCRYS:No clincally significant crystals seen. No clincally significant crystals seen.   Rheumatoid Factor      <20 IU/mL <20   Sed Rate      0 - 30 mm/h 7   Glucose      70 - 99 mg/dL    Copath Report            Component      Latest Ref Rng & Units 6/6/2018           6:03 AM   WBC      4.0 - 11.0 10e9/L    RBC Count      3.8 - 5.2 10e12/L    Hemoglobin      11.7 - 15.7 g/dL    Hematocrit      35.0 - 47.0 %    MCV      78 - 100 fl    MCH      26.5 - 33.0 pg    MCHC      31.5 - 36.5 g/dL    RDW      10.0 - 15.0 %    Platelet Count      150 - 450 10e9/L    Body Fluid Analysis Source          Color Fluid          Appearance Fluid          WBC Fluid      /uL    % Lymphocytes Fluid      %    % Mono/Macro Fluid      %    MAGALY interpretation      NEG:Negative    MAGALY pattern 1          MAGALY titer 1          Specimen Description          Special Requests          Culture Micro          Crystal Analysis      NOCRYS:No clincally significant crystals seen.    Rheumatoid Factor      <20 IU/mL    Sed Rate      0 - 30 mm/h    Glucose      70 - 99 mg/dL 107 (H)   Copath Report            Component      Latest Ref Rng & Units 6/6/2018           8:05 AM   WBC      4.0 - 11.0 10e9/L    RBC Count      3.8 - 5.2 10e12/L    Hemoglobin      11.7 - 15.7 g/dL    Hematocrit      35.0 - 47.0 %    MCV      78 - 100 fl    MCH      26.5 - 33.0 pg    MCHC      31.5 - 36.5 g/dL    RDW      10.0 - 15.0 %    Platelet Count      150 - 450 10e9/L    Body Fluid Analysis Source          Color Fluid          Appearance  Fluid          WBC Fluid      /uL    % Lymphocytes Fluid      %    % Mono/Macro Fluid      %    MAGALY interpretation      NEG:Negative    MAGALY pattern 1          MAGALY titer 1          Specimen Description          Special Requests          Culture Micro          Crystal Analysis      NOCRYS:No clincally significant crystals seen.    Rheumatoid Factor      <20 IU/mL    Sed Rate      0 - 30 mm/h    Glucose      70 - 99 mg/dL    Copath Report       Patient Name: CHRISTIE DICK     Component      Latest Ref Rng & Units 6/6/2018           8:28 AM   WBC      4.0 - 11.0 10e9/L    RBC Count      3.8 - 5.2 10e12/L    Hemoglobin      11.7 - 15.7 g/dL    Hematocrit      35.0 - 47.0 %    MCV      78 - 100 fl    MCH      26.5 - 33.0 pg    MCHC      31.5 - 36.5 g/dL    RDW      10.0 - 15.0 %    Platelet Count      150 - 450 10e9/L    Body Fluid Analysis Source          Color Fluid          Appearance Fluid          WBC Fluid      /uL    % Lymphocytes Fluid      %    % Mono/Macro Fluid      %    MAGALY interpretation      NEG:Negative    MAGALY pattern 1          MAGALY titer 1          Specimen Description       Left Knee Synovium Biopsy Tissue   Special Requests          Culture Micro       No growth   Crystal Analysis      NOCRYS:No clincally significant crystals seen.    Rheumatoid Factor      <20 IU/mL    Sed Rate      0 - 30 mm/h    Glucose      70 - 99 mg/dL    Copath Report            Component      Latest Ref Rng & Units 6/6/2018           8:28 AM   WBC      4.0 - 11.0 10e9/L    RBC Count      3.8 - 5.2 10e12/L    Hemoglobin      11.7 - 15.7 g/dL    Hematocrit      35.0 - 47.0 %    MCV      78 - 100 fl    MCH      26.5 - 33.0 pg    MCHC      31.5 - 36.5 g/dL    RDW      10.0 - 15.0 %    Platelet Count      150 - 450 10e9/L    Body Fluid Analysis Source          Color Fluid          Appearance Fluid          WBC Fluid      /uL    % Lymphocytes Fluid      %    % Mono/Macro Fluid      %    MAGALY interpretation      NEG:Negative     MAGALY pattern 1          MAGALY titer 1          Specimen Description       Left Knee Synovium Biopsy Tissue   Special Requests       Received in anaerobic tubes.   Culture Micro       No anaerobes isolated   Crystal Analysis      NOCRYS:No clincally significant crystals seen.    Rheumatoid Factor      <20 IU/mL    Sed Rate      0 - 30 mm/h    Glucose      70 - 99 mg/dL    Copath Report            Scribe Disclosure:   Caitlyn SAENZ, am serving as a scribe to document services personally performed by Daniel Gallegos MD at this visit, based upon the provider's statements to me. All documentation has been reviewed by the aforementioned provider prior to being entered into the official medical record.     Portions of this medical record were completed by a scribe. UPON MY REVIEW AND AUTHENTICATION BY ELECTRONIC SIGNATURE, this confirms (a) I performed the applicable clinical services, and (b) the record is accurate.

## 2018-07-30 NOTE — MR AVS SNAPSHOT
After Visit Summary   7/30/2018    Nori Lazaro    MRN: [id number]           Patient Information     Date Of Birth          1965        Visit Information        Provider Department      7/30/2018 7:30 AM Daniel Gallegos MD [facility] Rheumatology        Today's Diagnoses     Left knee pain, unspecified chronicity    -  1      Care Instructions    1. Synovial lipomatosis, knee  2. Meniscal tears, knee  3. Borderline positive MAGALY    No evidence of bodywide rheumatic condition (lupus or rheumatoid arthritis).  Plan several followup blood tests; no treatment needed at present.          Follow-ups after your visit        Follow-up notes from your care team     Return if symptoms worsen or fail to improve.      Who to contact     If you have questions or need follow up information about today's clinic visit or your schedule please contact [facility] RHEUMATOLOGY directly at [phone].  Normal or non-critical lab and imaging results will be communicated to you by [employer], letter or phone within 4 business days after the clinic has received the results. If you do not hear from us within 7 days, please contact the clinic through [employer] or phone. If you have a critical or abnormal lab result, we will notify you by phone as soon as possible.  Submit refill requests through [employer] or call your pharmacy and they will forward the refill request to us. Please allow 3 business days for your refill to be completed.          Additional Information About Your Visit        [employer] Information     [employer] gives you secure access to your electronic health record. If you see a primary care provider, you can also send messages to your care team and make appointments. If you have questions, please call your primary care clinic.  If you do not have a primary care provider, please call [phone] and they will assist you.        Care EveryWhere ID     This is your Care EveryWhere ID. This could be used by other  "organizations to access your [place] medical records  [id number]        Your Vitals Were     Pulse Temperature Height Pulse Oximetry BMI (Body Mass Index)       71 97.6  F (36.4  C) (Oral) 1.626 m (5' 4\") 100% 24.31 kg/m2        Blood Pressure from Last 3 Encounters:   07/30/18 125/64   06/06/18 111/69   05/31/18 124/76    Weight from Last 3 Encounters:   07/30/18 64.2 kg (141 lb 9.6 oz)   06/06/18 64.2 kg (141 lb 8.6 oz)   05/31/18 65.5 kg (144 lb 6.4 oz)               Primary Care Provider Office Phone # Fax #    Liliana Colindres [phone]       [facility] OB GYN CONSULT [address]  [place] [address]        Equal Access to Services     [facility] : Hadii milena ku hadasho Somarilee, waaxda luqadaha, qaybta kaalmada adeegyada, sammy joyce . So [facility] [phone].    ATENCIÓN: Si eliasla español, tiene a uriarte disposición servicios gratuitos de asistencia lingüística. Llame al [phone].    We comply with applicable federal civil rights laws and [place] laws. We do not discriminate on the basis of race, color, national origin, age, disability, sex, sexual orientation, or gender identity.            Thank you!     Thank you for choosing [facility] RHEUMATOLOGY  for your care. Our goal is always to provide you with excellent care. Hearing back from our patients is one way we can continue to improve our services. Please take a few minutes to complete the written survey that you may receive in the mail after your visit with us. Thank you!             Your Updated Medication List - Protect others around you: Learn how to safely use, store and throw away your medicines at www.disposemymeds.org.          This list is accurate as of 7/30/18 11:59 PM.  Always use your most recent med list.                   Brand Name Dispense Instructions for use Diagnosis    ALGAL OMEGA-3  MG capsule   Generic drug:  Docosahexaenoic Acid      daily        " cholecalciferol 1000 UNIT tablet    vitamin D3     daily        Glucosamine Sulfate 750 MG Tabs      daily        HYDROcodone-acetaminophen 5-325 MG per tablet    NORCO    20 tablet    Take 1-2 tablets by mouth every 4 hours as needed for other (Moderate to Severe Pain)    Left knee pain, unspecified chronicity       MELATONIN PO      Take 1 mg by mouth    Left knee pain, unspecified chronicity       METHYL B-12 1000 MCG Lozg   Generic drug:  Methylcobalamin      daily        oxyCODONE-acetaminophen 5-325 MG per tablet    PERCOCET    12 tablet    Take 1-2 tablets by mouth every 4 hours as needed for pain (moderate to severe)    Left knee pain, unspecified chronicity       UNABLE TO FIND      Calm Magnesium- 2 teaspoons daily

## 2018-07-30 NOTE — LETTER
"7/30/2018      RE: Nori Lazaro  [address] [facility] [address]       Rheumatology Clinic Visit 1     Nori Lazaro MRN# [id number]   YOB: 1965 Age: 53 year old     Date of Visit: 07/31/2018  Primary care provider: Liliana Luciano          Assessment and Plan:   # Synovial lipomatosis, L knee  # Use-associated, knee-predominant oligoarthralgia  # Knee effusion and synovitis on MRI  # Borderline positive MAGALY.    Discussion:  Apart from her knees, patient describes no other chronic joint pain, constitutional symptoms, or extramusculoskeletal symptoms. Examination reveals full painless knee ROM without synovitis. Laboratory evaluation in spring 2018 reveals normal complete blood count, normal sedimentation rate and C-reactive protein, and borderline antinuclear antibody with a titer of 1-80, speckled pattern. Rheumatoid factor is negative.    We had a good discussion today about joint pain and the positive MAGALY.  I described my impression that there is little supporting evidence for the presence of a systemic rheumatic condition such as rheumatoid arthritis or systemic lupus erythematosus. Indeed, the patient's report of recent steady increase in intensity and frequency of physical exercise involving her joints is a reassuring sign that clinically significant inflammation is not present.  I do not have a good explanation for the finding of \"synovitis\" in knee MRIs.  This fact, and the observation that some cases of rheumatoid arthritis are oligoarticular in presentation and associated with negative rheumatoid factor, do suggest that measurement of cyclic citrullinated peptide antibodies will be in order. We discussed the fact that 2-5% of women above the age of 50 will show low-positive antinuclear antibodies.  However, lupus associated with positive MAGALY and other connective tissue diseases are diagnoses established through recognition of a pattern of symptoms, signs and " "laboratory findings.  In Ms. Lazaro's case, antinuclear antibody positivity is not attended by any symptoms or signs that suggest an inflammatory or autoimmune condition. I think it reasonable to maintain a stance of \"watchful waiting\", and review comprehensive review of systems on an annual basis to monitor for emerging autoimmunity in the future.  No immunomodulatory therapy is warranted at this time.  I will be in contact with the patient regarding the results of testing for cyclic citrullinated peptide antibodies.    It has been a pleasure participating in the care of Ms. Lazaro.  Daniel Gallegos MD  Rheumatologist, [facility]          Active Problem List:     Patient Active Problem List    Diagnosis Date Noted     Left knee pain, unspecified chronicity 11/28/2017     Priority: Medium     Right knee pain, unspecified chronicity 07/13/2017     Priority: Medium     Hip pain, right 07/13/2017     Priority: Medium     History of basal cell carcinoma 01/26/2016     Priority: Medium     Overview:   Under the right eye, treated in 2015  Nose, treated in 2014       History of dysplastic nevus 01/26/2016     Priority: Medium     Major depressive disorder, single episode, mild (H)      Priority: Medium     Endometrial polyp      Priority: Medium            History of Present Illness:   Nori Lazaro is a 53 year old female no PMH who presents for evaluation of positive MAGALY and knee synovitis.    Pt started having symptoms June 2017 when she was running, and had bilateral knee soreness and hamstring cramping. She dialed back her running regimen but continued biking and swimming. In November 2017, she started a \"bootcamp\" x 1 day and had difficulty walking with bilateral knee swelling and feeling as though her hamstring was sore. She additionally felt as though her L knee was going to give out. She again dialed back her exercise regimen.    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  There were also findings of mild \"synovitis\" and mild to moderate knee effusions.  She started following with Dr. 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  Dr. Suarez performed basic workup for autoimmunity, including complete blood count, sedimentation rate, and rheumatoid factor, all of which were negative.  He also did a synovial biopsy that revealed synovial lipomatosis.     She has been told she had arthritis in her R3rd digit and in her R foot. She has dislocated her R elbow in the past and it will have occasional sharp pains over lateral epicondyle after high levels of activity. She does not have issues with morning stiffness in any of her joints. No history of unexplained fatigue. She endorses mild dry eyes and will occasionally use drops, however attributes this to swimming frequently. No dry mouth. No ulcers in her mouth. No rashes. No chest pain, shortness of breath, nausea, vomiting, abdominal pain, or bleeding.    She is gradually returning to a extremely vigorous exercise program.  Prior to onset of knee pain in November, she generated 190 hernandez of 20 minutes cycling; she reports being now back to 80 percent of this.   Current regimen:   40 miles cycling, 30 minutes hill repeats 3 times weekly. Interval workout twice per week for an hour; swims 3 times per week.         Review of Systems:     Constitutional: negative  Skin: negative  Eyes: negative  Ears/Nose/Throat: negative  Respiratory: No shortness of breath, dyspnea on exertion, cough, or hemoptysis  Cardiovascular: negative  Gastrointestinal: negative  Genitourinary: negative  Musculoskeletal: HPI  Neurologic: negative  Psychiatric: negative  Hematologic/Lymphatic/Immunologic: negative  Endocrine: negative          Past Medical History:     Past Medical History:   Diagnosis Date     Endometrial polyp      Hx of skin " cancer, basal cell 2015     Other and unspecified hyperlipidemia      PONV (postoperative nausea and vomiting)     In the past.     Past Surgical History:   Procedure Laterality Date     BIOPSY OF SKIN LESION       BIOPSY SYNOVIUM KNEE Left 6/6/2018    Procedure: BIOPSY SYNOVIUM KNEE;;  Surgeon: Leo Bar MD;  Location:  OR     C PELVIS/HIP JOINT SURGERY UNLISTED  6/2012    hamstring tear /reattachment     DILATION AND CURETTAGE, HYSTEROSCOPY DIAGNOSTIC, COMBINED  8/13/2014    Procedure: COMBINED DILATION AND CURETTAGE, HYSTEROSCOPY DIAGNOSTIC;  Surgeon: Liliana Luciano MD;  Location: [facility]     EXCISE MASS LOWER EXTREMITY Left 6/6/2018    Procedure: EXCISE MASS LOWER EXTREMITY;  Excision Biopsy Of Left Knee, Synovial Biopsy Left Knee;  Surgeon: Leo Bar MD;  Location:  OR            Social History:     Social History     Occupational History     Not on file.     Social History Main Topics     Smoking status: Never Smoker     Smokeless tobacco: Never Used     Alcohol use Yes      Comment: on average 1 glass of wine or 1 beer a week     Drug use: No     Sexual activity: Yes     Partners: Male     Birth control/ protection: Post-menopausal            Family History:     Family History   Problem Relation Age of Onset     Hypertension Father      Arthritis Father      Alcoholism Father      Diabetes Paternal Grandmother      Arthritis Paternal Grandmother      HEART DISEASE Paternal Grandmother      Lipids Paternal Grandmother      Coronary Artery Disease Paternal Grandmother      Breast Cancer Mother      Alcohol/Drug Mother      Anesthesia Reaction Mother      Neurologic Disorder Mother      Lipids Mother      Osteoperosis Mother      Migraines Mother      Alcoholism Mother      Arthritis Maternal Grandmother      Eye Disorder Maternal Grandmother      HEART DISEASE Maternal Grandmother      Osteoperosis Maternal Grandmother      Depression Sister      Depression Maternal Grandfather       "Mental Illness Maternal Grandfather      HEART DISEASE Paternal Grandfather      Psychotic Disorder Paternal Grandfather      Coronary Artery Disease Paternal Grandfather      Other Cancer Paternal Grandfather      lung     Psychotic Disorder Sister      Cancer Other      lung cancer - paternal grandfather (smoker)     Mental Illness Sister             Allergies:   No Known Allergies         Medications:     Current Outpatient Prescriptions   Medication Sig Dispense Refill     cholecalciferol (VITAMIN D3) 1000 UNIT tablet daily        Docosahexaenoic Acid (ALGAL OMEGA-3 DHA) 200 MG capsule daily        Glucosamine Sulfate 750 MG TABS daily        MELATONIN PO Take 1 mg by mouth       Methylcobalamin (METHYL B-12) 1000 MCG LOZG daily        UNABLE TO FIND Calm Magnesium- 2 teaspoons daily       HYDROcodone-acetaminophen (NORCO) 5-325 MG per tablet Take 1-2 tablets by mouth every 4 hours as needed for other (Moderate to Severe Pain) (Patient not taking: Reported on 7/30/2018) 20 tablet 0     oxyCODONE-acetaminophen (PERCOCET) 5-325 MG per tablet Take 1-2 tablets by mouth every 4 hours as needed for pain (moderate to severe) (Patient not taking: Reported on 7/30/2018) 12 tablet 0            Physical Exam:   Blood pressure 125/64, pulse 71, temperature 97.6  F (36.4  C), temperature source Oral, height 1.626 m (5' 4\"), weight 64.2 kg (141 lb 9.6 oz), SpO2 100 %.  Wt Readings from Last 4 Encounters:   07/30/18 64.2 kg (141 lb 9.6 oz)   06/06/18 64.2 kg (141 lb 8.6 oz)   05/31/18 65.5 kg (144 lb 6.4 oz)   05/31/18 65.5 kg (144 lb 6.4 oz)       Constitutional: well-developed, appearing stated age; cooperative  Eyes: nl EOM, PERRLA, vision, conjunctiva, sclera  ENT: nl external ears, nose, hearing, lips, teeth, gums, throat  No mucous membrane lesions, normal saliva pool  Neck: no mass or thyroid enlargement  Resp: lungs clear to auscultation, nl to palpation  CV: RRR, no murmurs, rubs or gallops, no edema  GI: no ABD mass " or tenderness, no HSM  : not tested  Lymph: no cervical, supraclavicular, inguinal or epitrochlear nodes  MS: The TMJ, neck, shoulder, elbow, wrist, MCP/PIP/DIP, spine, hip, knee, ankle, and foot MTP/IP joints were examined and found normal. No active synovitis or altered joint anatomy. Full joint ROM. Normal  strength. No dactylitis,  tenosynovitis, enthesopathy.  Skin: no nail pitting, alopecia, rash, nodules or lesions  Neuro: nl cranial nerves, strength, sensation, DTRs.   Psych: nl judgement, orientation, memory, affect.    Other notes:  excellent  strength   no major knee effusion   medial aspect left knee with well healed scar   excellent fluid knee range of motion on the left with mild crepitus   no pain with distraction at the mid foot   excellent alignment of all the MTPs and ankles  excellent planar and dorsiflexion   scar to left ankle from when she dropped a mirror in the past          Data:     Results for orders placed or performed during the hospital encounter of 06/06/18   Glucose by meter   Result Value Ref Range    Glucose 107 (H) 70 - 99 mg/dL   Surgical pathology exam   Result Value Ref Range    Copath Report       Patient Name: CHRISTIE DICK  MR#: [id number]  Specimen #: [id number]  Collected: 6/6/2018  Received: 6/6/2018  Reported: 6/8/2018 17:44  Ordering Phy(s): SILVERIO SUÁREZ    For improved result formatting, select 'View Enhanced Report Format' under   Linked Documents section.    ORIGINAL REPORT:    SPECIMEN(S):  A: Medial left knee  B: Left knee synovium biopsy    FINAL DIAGNOSIS:  A. Soft tissue, medial left knee, excision:  - Schwannoma    B. Left knee synovium biopsy:  - Synovial lipomatosis    I have personally reviewed all specimens and/or slides, including the   listed special stains, and used them  with my medical judgement to determine or confirm the final diagnosis.    Electronically signed out by:    Brielle Snow M.D., [facility]    CLINICAL  "HISTORY:  53-year-old female with a painful ovoid soft tissue mass measuring 2.6 x   1.8 x 1.0 cm identified beneath the  pedis tendons and the posterior lateral aspect of her medial femoral   condyle of the left knee.  Procedure:   Excisional biopsy and synovial biopsy.    GROSS:  A:  The specimen is received fresh with proper patient identification,   labeled \"left medial knee\".  The  specimen consists of a yellow-tan soft tissue measuring 2.6 x 1.5 x 1.2   cm.  The specimen is serially  sectioned and a representative section is submitted for frozen section and   resubmitted for permanent section  in cassette A1FS.  The remainder of the specimen is submitted entirely in   cassettes A2-A3. (Dictated by:  Kim Lazaro 6/6/2018 01:23 PM)    B:  The specimen is received in formalin with proper patient   identification, labeled \"left knee synovium  biopsy\".  The specimen consists of multiple tan-yellow soft tissues   measuring 1.9 x 1.5 x 0.5 cm in aggregate.   The specimen is submitted entirely in cassette B1. (Dictated by: Kim Lazaro 6/7/2018 10:05 AM)    INTRAOPERATIVE CONSULTATION:  Frozen section is performed on part A. Please refer to Epic Result History   for the Preliminary Intraoperative  Diagn osis.    MICROSCOPIC:  Microscopic examination is performed.    CPT Codes:  A: 87741-MQ5, 55859-IR  B: 53536-PA3    TESTING LAB LOCATION:  [address] [address] [phone]    COLLECTION SITE:  Client: [facility]  Location: [facility] (B)    Resident  [id number]     Anaerobic bacterial culture   Result Value Ref Range    Specimen Description Left Knee Synovium Biopsy Tissue     Special Requests Received in anaerobic tubes.     Culture Micro No anaerobes isolated    Tissue Culture Aerobic Bacterial   Result Value Ref Range    Specimen Description Left Knee Synovium Biopsy Tissue     Culture " Micro No growth        Recent Labs   Lab Test  04/30/18   0935   WBC  4.0   RBC  5.16   HGB  15.6   HCT  46.5   MCV  90   RDW  12.8   PLT  206      No results for input(s): TSH, T4 in the last 26547 hours.  Hemoglobin   Date Value Ref Range Status   04/30/2018 15.6 11.7 - 15.7 g/dL Final     Sed Rate   Date Value Ref Range Status   04/30/2018 7 0 - 30 mm/h Final     CRP Inflammation   Date Value Ref Range Status   07/30/2018 <2.9 0.0 - 8.0 mg/L Final     Rheumatoid Factor   Date Value Ref Range Status   04/30/2018 <20 <20 IU/mL Final     Recent Labs   Lab Test  04/30/18   0935   WBC  4.0   HGB  15.6   HCT  46.5   MCV  90   PLT  206     RHEUM RESULTS Latest Ref Rng & Units 4/30/2018 7/30/2018   SED RATE 0 - 30 mm/h 7 -   CRP, INFLAMMATION 0.0 - 8.0 mg/L - <2.9   RHEUMATOID FACTOR <20 IU/mL <20 -   WBC 4.0 - 11.0 10e9/L 4.0 -   RBC 3.8 - 5.2 10e12/L 5.16 -   HGB 11.7 - 15.7 g/dL 15.6 -   HCT 35.0 - 47.0 % 46.5 -   MCV 78 - 100 fl 90 -   MCHC 31.5 - 36.5 g/dL 33.5 -   RDW 10.0 - 15.0 % 12.8 -    - 450 10e9/L 206 -       MISC   DNA-DS No results found for: DNA Myositis Interp/ ESPERANZA-1 Antibody, IGG No results found for: MYOINT, JO1ABY Cardiolipin AB IGG/IGM No results found for: CARDG, CARDM Rheumatoid Factor   Rheumatoid Factor   Date Value Ref Range Status   04/30/2018 <20 <20 IU/mL Final    Histone AB  No results found for: HSTO Beta 2 Glycoprotein 1 Antibody IGG/IGM  No results found for: B2GPG, B2GPM Centromere Antibody IGG  No results found for: CENTA CCP Antibodies No results found for: CCPIGG Wilson RNP/SERGIO Antibodies No results found for: [id number], SMIGG, [id number] Scleroderma Antiobody SCL-70 EN IGG  No results found for: SCLIGG SERGIO Screen  No results found for: ENASC Direct Antiglobulin  No results found for: CODY Immunoglobulin G Subclasses No results found for: [id number], [id number], [id number], [id number] MAGALY by IFA IGG    MAGALY interpretation   Date Value Ref Range Status   04/30/2018 Borderline Positive  (A) NEG^Negative Final     Comment:                                        Reference range:  <1:40  NEGATIVE  1:40 - 1:80  BORDERLINE POSITIVE  >1:80 POSITIVE       MAGALY pattern 1   Date Value Ref Range Status   04/30/2018 SPECKLED  Final     MAGALY titer 1   Date Value Ref Range Status   04/30/2018 1:80  Final    RNA POLYMERASE III CALE IGG  No results found for: RNAPG RPR  No results found for: [id number] RNP Antibodies  No results found for: [id number] Ribosomal P Protein  No results found for: RIBIGG Mitochondrial Cale, IGG  No results found for: MITABY Protein Immunofixation Serum No results found for: IEP, IGG, IGA, IGM Protein Electrophoresis No results found for: ELPA, ELPA2, ELPB, ELPG, ELPM, ELPINT SSA (RO) / SSB (LA) SERGIO Antibody, IGG  No results found for: SSAIGG, SSBIGG  Reviewed Rheumatology lab flowsheet    Vani Angulo DO  PGY3 Visiting Internal Medicine Resident    I saw this patient with the medical resident. I agree with the findings and recommendations.    Daniel Gallegos M.D.  Staff Rheumatologist, [facility]  Pager [phone]

## 2018-07-30 NOTE — NURSING NOTE
"Chief Complaint   Patient presents with     Consult     had surgery to take out nerve in knee, boderline pos MAGALY      /64  Pulse 71  Temp 97.6  F (36.4  C) (Oral)  Ht 1.626 m (5' 4\")  Wt 64.2 kg (141 lb 9.6 oz)  SpO2 100%  BMI 24.31 kg/m2  Rohit Moore, CMA    "

## 2018-07-30 NOTE — PROGRESS NOTES
"Rheumatology Clinic Visit 1     Nroi Lazaro MRN# [id number]   YOB: 1965 Age: 53 year old     Date of Visit: 07/31/2018  Primary care provider: Liliana Luciano          Assessment and Plan:   # Synovial lipomatosis, L knee  # Use-associated, knee-predominant oligoarthralgia  # Knee effusion and synovitis on MRI  # Borderline positive MAGALY.    Discussion:  Apart from her knees, patient describes no other chronic joint pain, constitutional symptoms, or extramusculoskeletal symptoms. Examination reveals full painless knee ROM without synovitis. Laboratory evaluation in spring 2018 reveals normal complete blood count, normal sedimentation rate and C-reactive protein, and borderline antinuclear antibody with a titer of 1-80, speckled pattern. Rheumatoid factor is negative.    We had a good discussion today about joint pain and the positive MAGALY.  I described my impression that there is little supporting evidence for the presence of a systemic rheumatic condition such as rheumatoid arthritis or systemic lupus erythematosus. Indeed, the patient's report of recent steady increase in intensity and frequency of physical exercise involving her joints is a reassuring sign that clinically significant inflammation is not present.  I do not have a good explanation for the finding of \"synovitis\" in knee MRIs.  This fact, and the observation that some cases of rheumatoid arthritis are oligoarticular in presentation and associated with negative rheumatoid factor, do suggest that measurement of cyclic citrullinated peptide antibodies will be in order. We discussed the fact that 2-5% of women above the age of 50 will show low-positive antinuclear antibodies.  However, lupus associated with positive MAGALY and other connective tissue diseases are diagnoses established through recognition of a pattern of symptoms, signs and laboratory findings.  In Ms. Lazaro's case, antinuclear antibody positivity is not " "attended by any symptoms or signs that suggest an inflammatory or autoimmune condition. I think it reasonable to maintain a stance of \"watchful waiting\", and review comprehensive review of systems on an annual basis to monitor for emerging autoimmunity in the future.  No immunomodulatory therapy is warranted at this time.  I will be in contact with the patient regarding the results of testing for cyclic citrullinated peptide antibodies.    It has been a pleasure participating in the care of Ms. Lazaro.  Daniel Gallegos MD  Rheumatologist, [facility]          Active Problem List:     Patient Active Problem List    Diagnosis Date Noted     Left knee pain, unspecified chronicity 11/28/2017     Priority: Medium     Right knee pain, unspecified chronicity 07/13/2017     Priority: Medium     Hip pain, right 07/13/2017     Priority: Medium     History of basal cell carcinoma 01/26/2016     Priority: Medium     Overview:   Under the right eye, treated in 2015  Nose, treated in 2014       History of dysplastic nevus 01/26/2016     Priority: Medium     Major depressive disorder, single episode, mild (H)      Priority: Medium     Endometrial polyp      Priority: Medium            History of Present Illness:   Nori Lazaro is a 53 year old female no PMH who presents for evaluation of positive MAGALY and knee synovitis.    Pt started having symptoms June 2017 when she was running, and had bilateral knee soreness and hamstring cramping. She dialed back her running regimen but continued biking and swimming. In November 2017, she started a \"bootcamp\" x 1 day and had difficulty walking with bilateral knee swelling and feeling as though her hamstring was sore. She additionally felt as though her L knee was going to give out. She again dialed back her exercise regimen.    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  There were also findings of mild \"synovitis\" and mild to moderate knee effusions.  She started following with Dr. 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  Dr. Suarez performed basic workup for autoimmunity, including complete blood count, sedimentation rate, and rheumatoid factor, all of which were negative.  He also did a synovial biopsy that revealed synovial lipomatosis.     She has been told she had arthritis in her R3rd digit and in her R foot. She has dislocated her R elbow in the past and it will have occasional sharp pains over lateral epicondyle after high levels of activity. She does not have issues with morning stiffness in any of her joints. No history of unexplained fatigue. She endorses mild dry eyes and will occasionally use drops, however attributes this to swimming frequently. No dry mouth. No ulcers in her mouth. No rashes. No chest pain, shortness of breath, nausea, vomiting, abdominal pain, or bleeding.    She is gradually returning to a extremely vigorous exercise program.  Prior to onset of knee pain in November, she generated 190 hernandez of 20 minutes cycling; she reports being now back to 80 percent of this.   Current regimen:   40 miles cycling, 30 minutes hill repeats 3 times weekly. Interval workout twice per week for an hour; swims 3 times per week.         Review of Systems:     Constitutional: negative  Skin: negative  Eyes: negative  Ears/Nose/Throat: negative  Respiratory: No shortness of breath, dyspnea on exertion, cough, or hemoptysis  Cardiovascular: negative  Gastrointestinal: negative  Genitourinary: negative  Musculoskeletal: HPI  Neurologic: negative  Psychiatric: negative  Hematologic/Lymphatic/Immunologic: negative  Endocrine: negative          Past Medical History:     Past Medical History:   Diagnosis Date     Endometrial polyp      Hx of skin cancer, basal cell 2015     Other and unspecified hyperlipidemia      PONV " (postoperative nausea and vomiting)     In the past.     Past Surgical History:   Procedure Laterality Date     BIOPSY OF SKIN LESION       BIOPSY SYNOVIUM KNEE Left 6/6/2018    Procedure: BIOPSY SYNOVIUM KNEE;;  Surgeon: Leo Bar MD;  Location:  OR      PELVIS/HIP JOINT SURGERY UNLISTED  6/2012    hamstring tear /reattachment     DILATION AND CURETTAGE, HYSTEROSCOPY DIAGNOSTIC, COMBINED  8/13/2014    Procedure: COMBINED DILATION AND CURETTAGE, HYSTEROSCOPY DIAGNOSTIC;  Surgeon: Liliana Luciano MD;  Location: [facility]     EXCISE MASS LOWER EXTREMITY Left 6/6/2018    Procedure: EXCISE MASS LOWER EXTREMITY;  Excision Biopsy Of Left Knee, Synovial Biopsy Left Knee;  Surgeon: Leo Bar MD;  Location:  OR            Social History:     Social History     Occupational History     Not on file.     Social History Main Topics     Smoking status: Never Smoker     Smokeless tobacco: Never Used     Alcohol use Yes      Comment: on average 1 glass of wine or 1 beer a week     Drug use: No     Sexual activity: Yes     Partners: Male     Birth control/ protection: Post-menopausal            Family History:     Family History   Problem Relation Age of Onset     Hypertension Father      Arthritis Father      Alcoholism Father      Diabetes Paternal Grandmother      Arthritis Paternal Grandmother      HEART DISEASE Paternal Grandmother      Lipids Paternal Grandmother      Coronary Artery Disease Paternal Grandmother      Breast Cancer Mother      Alcohol/Drug Mother      Anesthesia Reaction Mother      Neurologic Disorder Mother      Lipids Mother      Osteoperosis Mother      Migraines Mother      Alcoholism Mother      Arthritis Maternal Grandmother      Eye Disorder Maternal Grandmother      HEART DISEASE Maternal Grandmother      Osteoperosis Maternal Grandmother      Depression Sister      Depression Maternal Grandfather      Mental Illness Maternal Grandfather      HEART DISEASE Paternal Grandfather   "    Psychotic Disorder Paternal Grandfather      Coronary Artery Disease Paternal Grandfather      Other Cancer Paternal Grandfather      lung     Psychotic Disorder Sister      Cancer Other      lung cancer - paternal grandfather (smoker)     Mental Illness Sister             Allergies:   No Known Allergies         Medications:     Current Outpatient Prescriptions   Medication Sig Dispense Refill     cholecalciferol (VITAMIN D3) 1000 UNIT tablet daily        Docosahexaenoic Acid (ALGAL OMEGA-3 DHA) 200 MG capsule daily        Glucosamine Sulfate 750 MG TABS daily        MELATONIN PO Take 1 mg by mouth       Methylcobalamin (METHYL B-12) 1000 MCG LOZG daily        UNABLE TO FIND Calm Magnesium- 2 teaspoons daily       HYDROcodone-acetaminophen (NORCO) 5-325 MG per tablet Take 1-2 tablets by mouth every 4 hours as needed for other (Moderate to Severe Pain) (Patient not taking: Reported on 7/30/2018) 20 tablet 0     oxyCODONE-acetaminophen (PERCOCET) 5-325 MG per tablet Take 1-2 tablets by mouth every 4 hours as needed for pain (moderate to severe) (Patient not taking: Reported on 7/30/2018) 12 tablet 0            Physical Exam:   Blood pressure 125/64, pulse 71, temperature 97.6  F (36.4  C), temperature source Oral, height 1.626 m (5' 4\"), weight 64.2 kg (141 lb 9.6 oz), SpO2 100 %.  Wt Readings from Last 4 Encounters:   07/30/18 64.2 kg (141 lb 9.6 oz)   06/06/18 64.2 kg (141 lb 8.6 oz)   05/31/18 65.5 kg (144 lb 6.4 oz)   05/31/18 65.5 kg (144 lb 6.4 oz)       Constitutional: well-developed, appearing stated age; cooperative  Eyes: nl EOM, PERRLA, vision, conjunctiva, sclera  ENT: nl external ears, nose, hearing, lips, teeth, gums, throat  No mucous membrane lesions, normal saliva pool  Neck: no mass or thyroid enlargement  Resp: lungs clear to auscultation, nl to palpation  CV: RRR, no murmurs, rubs or gallops, no edema  GI: no ABD mass or tenderness, no HSM  : not tested  Lymph: no cervical, supraclavicular, " inguinal or epitrochlear nodes  MS: The TMJ, neck, shoulder, elbow, wrist, MCP/PIP/DIP, spine, hip, knee, ankle, and foot MTP/IP joints were examined and found normal. No active synovitis or altered joint anatomy. Full joint ROM. Normal  strength. No dactylitis,  tenosynovitis, enthesopathy.  Skin: no nail pitting, alopecia, rash, nodules or lesions  Neuro: nl cranial nerves, strength, sensation, DTRs.   Psych: nl judgement, orientation, memory, affect.    Other notes:  excellent  strength   no major knee effusion   medial aspect left knee with well healed scar   excellent fluid knee range of motion on the left with mild crepitus   no pain with distraction at the mid foot   excellent alignment of all the MTPs and ankles  excellent planar and dorsiflexion   scar to left ankle from when she dropped a mirror in the past          Data:     Results for orders placed or performed during the hospital encounter of 06/06/18   Glucose by meter   Result Value Ref Range    Glucose 107 (H) 70 - 99 mg/dL   Surgical pathology exam   Result Value Ref Range    Copath Report       Patient Name: CHRISTIE DICK  MR#: [id number]  Specimen #: [id number]  Collected: 6/6/2018  Received: 6/6/2018  Reported: 6/8/2018 17:44  Ordering Phy(s): SILVERIO SUÁREZ    For improved result formatting, select 'View Enhanced Report Format' under   Linked Documents section.    ORIGINAL REPORT:    SPECIMEN(S):  A: Medial left knee  B: Left knee synovium biopsy    FINAL DIAGNOSIS:  A. Soft tissue, medial left knee, excision:  - Schwannoma    B. Left knee synovium biopsy:  - Synovial lipomatosis    I have personally reviewed all specimens and/or slides, including the   listed special stains, and used them  with my medical judgement to determine or confirm the final diagnosis.    Electronically signed out by:    Brielle Snow M.D., [facility]    CLINICAL HISTORY:  53-year-old female with a painful ovoid soft tissue mass measuring 2.6 x   1.8 x  "1.0 cm identified beneath the  pedis tendons and the posterior lateral aspect of her medial femoral   condyle of the left knee.  Procedure:   Excisional biopsy and synovial biopsy.    GROSS:  A:  The specimen is received fresh with proper patient identification,   labeled \"left medial knee\".  The  specimen consists of a yellow-tan soft tissue measuring 2.6 x 1.5 x 1.2   cm.  The specimen is serially  sectioned and a representative section is submitted for frozen section and   resubmitted for permanent section  in cassette A1FS.  The remainder of the specimen is submitted entirely in   cassettes A2-A3. (Dictated by:  Kim Lazaro 6/6/2018 01:23 PM)    B:  The specimen is received in formalin with proper patient   identification, labeled \"left knee synovium  biopsy\".  The specimen consists of multiple tan-yellow soft tissues   measuring 1.9 x 1.5 x 0.5 cm in aggregate.   The specimen is submitted entirely in cassette B1. (Dictated by: Kim Lazaro 6/7/2018 10:05 AM)    INTRAOPERATIVE CONSULTATION:  Frozen section is performed on part A. Please refer to Epic Result History   for the Preliminary Intraoperative  Diagn osis.    MICROSCOPIC:  Microscopic examination is performed.    CPT Codes:  A: 05250-ID1, 69392-DA  B: 70310-NQ3    TESTING LAB LOCATION:  [address] [address] [phone]    COLLECTION SITE:  Client: [facility]  Location: [facility] (B)    Resident  [id number]     Anaerobic bacterial culture   Result Value Ref Range    Specimen Description Left Knee Synovium Biopsy Tissue     Special Requests Received in anaerobic tubes.     Culture Micro No anaerobes isolated    Tissue Culture Aerobic Bacterial   Result Value Ref Range    Specimen Description Left Knee Synovium Biopsy Tissue     Culture Micro No growth        Recent Labs   Lab Test  04/30/18   0935   WBC  4.0   RBC  5.16   HGB  " 15.6   HCT  46.5   MCV  90   RDW  12.8   PLT  206      No results for input(s): TSH, T4 in the last 96402 hours.  Hemoglobin   Date Value Ref Range Status   04/30/2018 15.6 11.7 - 15.7 g/dL Final     Sed Rate   Date Value Ref Range Status   04/30/2018 7 0 - 30 mm/h Final     CRP Inflammation   Date Value Ref Range Status   07/30/2018 <2.9 0.0 - 8.0 mg/L Final     Rheumatoid Factor   Date Value Ref Range Status   04/30/2018 <20 <20 IU/mL Final     Recent Labs   Lab Test  04/30/18   0935   WBC  4.0   HGB  15.6   HCT  46.5   MCV  90   PLT  206     RHEUM RESULTS Latest Ref Rng & Units 4/30/2018 7/30/2018   SED RATE 0 - 30 mm/h 7 -   CRP, INFLAMMATION 0.0 - 8.0 mg/L - <2.9   RHEUMATOID FACTOR <20 IU/mL <20 -   WBC 4.0 - 11.0 10e9/L 4.0 -   RBC 3.8 - 5.2 10e12/L 5.16 -   HGB 11.7 - 15.7 g/dL 15.6 -   HCT 35.0 - 47.0 % 46.5 -   MCV 78 - 100 fl 90 -   MCHC 31.5 - 36.5 g/dL 33.5 -   RDW 10.0 - 15.0 % 12.8 -    - 450 10e9/L 206 -       MISC   DNA-DS No results found for: DNA Myositis Interp/ ESPERANZA-1 Antibody, IGG No results found for: MYOINT, JO1ABY Cardiolipin AB IGG/IGM No results found for: CARDG, CARDM Rheumatoid Factor   Rheumatoid Factor   Date Value Ref Range Status   04/30/2018 <20 <20 IU/mL Final    Histone AB  No results found for: HSTO Beta 2 Glycoprotein 1 Antibody IGG/IGM  No results found for: B2GPG, B2GPM Centromere Antibody IGG  No results found for: CENTA CCP Antibodies No results found for: CCPIGG Wilson RNP/SERGIO Antibodies No results found for: [id number], SMIGG, [id number] Scleroderma Antiobody SCL-70 EN IGG  No results found for: SCLIGG SERGIO Screen  No results found for: ENASC Direct Antiglobulin  No results found for: CODY Immunoglobulin G Subclasses No results found for: [id number], [id number], [id number], [id number] MAGALY by IFA IGG    MAGALY interpretation   Date Value Ref Range Status   04/30/2018 Borderline Positive (A) NEG^Negative Final     Comment:                                        Reference  range:  <1:40  NEGATIVE  1:40 - 1:80  BORDERLINE POSITIVE  >1:80 POSITIVE       MAGALY pattern 1   Date Value Ref Range Status   04/30/2018 SPECKLED  Final     MAGALY titer 1   Date Value Ref Range Status   04/30/2018 1:80  Final    RNA POLYMERASE III CALE IGG  No results found for: RNAPG RPR  No results found for: [id number] RNP Antibodies  No results found for: [id number] Ribosomal P Protein  No results found for: RIBIGG Mitochondrial Cale, IGG  No results found for: MITABY Protein Immunofixation Serum No results found for: IEP, IGG, IGA, IGM Protein Electrophoresis No results found for: ELPA, ELPA2, ELPB, ELPG, ELPM, ELPINT SSA (RO) / SSB (LA) SERGIO Antibody, IGG  No results found for: SSAIGG, SSBIGG  Reviewed Rheumatology lab flowsheet    Vani Angulo DO  PGY3 Visiting Internal Medicine Resident    I saw this patient with the medical resident. I agree with the findings and recommendations.    Daniel Gallegos M.D.  Staff Rheumatologist, [facility]  Pager [phone]

## 2018-07-30 NOTE — PATIENT INSTRUCTIONS
1. Synovial lipomatosis, knee  2. Meniscal tears, knee  3. Borderline positive MAGALY    No evidence of bodywide rheumatic condition (lupus or rheumatoid arthritis).  Plan several followup blood tests; no treatment needed at present.

## 2018-07-31 LAB — CCP AB SER IA-ACNC: 3 U/ML

## 2018-08-04 ENCOUNTER — APPOINTMENT (OUTPATIENT)
Dept: MRI IMAGING | Facility: OTHER | Age: 53
End: 2018-08-04
Attending: PHYSICIAN ASSISTANT
Payer: COMMERCIAL

## 2018-08-04 ENCOUNTER — HOSPITAL ENCOUNTER (OUTPATIENT)
Facility: OTHER | Age: 53
Setting detail: OBSERVATION
Discharge: HOME | End: 2018-08-04
Attending: PHYSICIAN ASSISTANT | Admitting: INTERNAL MEDICINE
Payer: COMMERCIAL

## 2018-08-04 ENCOUNTER — APPOINTMENT (OUTPATIENT)
Dept: GENERAL RADIOLOGY | Facility: OTHER | Age: 53
End: 2018-08-04
Attending: PHYSICIAN ASSISTANT
Payer: COMMERCIAL

## 2018-08-04 ENCOUNTER — APPOINTMENT (OUTPATIENT)
Dept: CT IMAGING | Facility: OTHER | Age: 53
End: 2018-08-04
Attending: PHYSICIAN ASSISTANT
Payer: COMMERCIAL

## 2018-08-04 VITALS
WEIGHT: 141.31 LBS | BODY MASS INDEX: 24.13 KG/M2 | HEART RATE: 94 BPM | DIASTOLIC BLOOD PRESSURE: 69 MMHG | TEMPERATURE: 99.1 F | RESPIRATION RATE: 18 BRPM | OXYGEN SATURATION: 100 % | HEIGHT: 64 IN | SYSTOLIC BLOOD PRESSURE: 132 MMHG

## 2018-08-04 DIAGNOSIS — S06.5XAA ACUTE SUBDURAL HEMATOMA (H): ICD-10-CM

## 2018-08-04 DIAGNOSIS — S50.312A ELBOW ABRASION, LEFT, INITIAL ENCOUNTER: ICD-10-CM

## 2018-08-04 DIAGNOSIS — S09.90XA INJURY OF HEAD, INITIAL ENCOUNTER: ICD-10-CM

## 2018-08-04 DIAGNOSIS — R07.89 LEFT-SIDED CHEST WALL PAIN: ICD-10-CM

## 2018-08-04 DIAGNOSIS — S20.212A RIB CONTUSION, LEFT, INITIAL ENCOUNTER: ICD-10-CM
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
## 2018-08-04 DIAGNOSIS — S80.212A KNEE ABRASION, LEFT, INITIAL ENCOUNTER: ICD-10-CM

## 2018-08-04 PROBLEM — S06.0XAA CONCUSSION: Status: ACTIVE | Noted: 2018-08-04

## 2018-08-04 LAB
ALBUMIN SERPL-MCNC: 3.9 G/DL (ref 3.5–5.7)
ALBUMIN UR-MCNC: NEGATIVE MG/DL
ALP SERPL-CCNC: 81 U/L (ref 34–104)
ALT SERPL W P-5'-P-CCNC: 17 U/L (ref 7–52)
ANION GAP SERPL CALCULATED.3IONS-SCNC: 7 MMOL/L (ref 3–14)
APPEARANCE UR: CLEAR
APTT PPP: 29 SEC (ref 26–39)
AST SERPL W P-5'-P-CCNC: 19 U/L (ref 13–39)
BASOPHILS # BLD AUTO: 0 10E9/L (ref 0–0.2)
BASOPHILS NFR BLD AUTO: 0.3 %
BILIRUB SERPL-MCNC: 0.6 MG/DL (ref 0.3–1)
BILIRUB UR QL STRIP: NEGATIVE
BUN SERPL-MCNC: 18 MG/DL (ref 7–25)
CALCIUM SERPL-MCNC: 9.4 MG/DL (ref 8.6–10.3)
CHLORIDE SERPL-SCNC: 103 MMOL/L (ref 98–107)
CO2 SERPL-SCNC: 27 MMOL/L (ref 21–31)
COLOR UR AUTO: YELLOW
CREAT SERPL-MCNC: 0.84 MG/DL (ref 0.6–1.2)
DIFFERENTIAL METHOD BLD: ABNORMAL
EOSINOPHIL # BLD AUTO: 0 10E9/L (ref 0–0.7)
EOSINOPHIL NFR BLD AUTO: 0.1 %
ERYTHROCYTE [DISTWIDTH] IN BLOOD BY AUTOMATED COUNT: 12.4 % (ref 10–15)
GFR SERPL CREATININE-BSD FRML MDRD: 70 ML/MIN/1.7M2
GLUCOSE SERPL-MCNC: 128 MG/DL (ref 70–105)
GLUCOSE UR STRIP-MCNC: NEGATIVE MG/DL
HCG UR QL: NEGATIVE
HCT VFR BLD AUTO: 41.2 % (ref 35–47)
HGB BLD-MCNC: 14.4 G/DL (ref 11.7–15.7)
HGB UR QL STRIP: NEGATIVE
IMM GRANULOCYTES # BLD: 0.1 10E9/L (ref 0–0.4)
IMM GRANULOCYTES NFR BLD: 0.5 %
INR PPP: 0.99 (ref 0–1.3)
KETONES UR STRIP-MCNC: NEGATIVE MG/DL
LEUKOCYTE ESTERASE UR QL STRIP: NEGATIVE
LYMPHOCYTES # BLD AUTO: 0.5 10E9/L (ref 0.8–5.3)
LYMPHOCYTES NFR BLD AUTO: 4.9 %
MCH RBC QN AUTO: 30.8 PG (ref 26.5–33)
MCHC RBC AUTO-ENTMCNC: 35 G/DL (ref 31.5–36.5)
MCV RBC AUTO: 88 FL (ref 78–100)
MONOCYTES # BLD AUTO: 0.4 10E9/L (ref 0–1.3)
MONOCYTES NFR BLD AUTO: 3.4 %
NEUTROPHILS # BLD AUTO: 9.9 10E9/L (ref 1.6–8.3)
NEUTROPHILS NFR BLD AUTO: 90.8 %
NITRATE UR QL: NEGATIVE
PH UR STRIP: 5.5 PH (ref 5–9)
PLATELET # BLD AUTO: 213 10E9/L (ref 150–450)
POTASSIUM SERPL-SCNC: 3.7 MMOL/L (ref 3.5–5.1)
PROT SERPL-MCNC: 6.9 G/DL (ref 6.4–8.9)
RADIOLOGIST FLAGS: ABNORMAL
RBC # BLD AUTO: 4.67 10E12/L (ref 3.8–5.2)
SODIUM SERPL-SCNC: 137 MMOL/L (ref 134–144)
SOURCE: NORMAL
SP GR UR STRIP: <1.005 (ref 1–1.03)
UROBILINOGEN UR STRIP-ACNC: 0.2 EU/DL (ref 0.2–1)
WBC # BLD AUTO: 10.9 10E9/L (ref 4–11)

## 2018-08-04 PROCEDURE — 99285 EMERGENCY DEPT VISIT HI MDM: CPT | Mod: 25 | Performed by: PHYSICIAN ASSISTANT

## 2018-08-04 PROCEDURE — 72125 CT NECK SPINE W/O DYE: CPT | Mod: TC

## 2018-08-04 PROCEDURE — 99285 EMERGENCY DEPT VISIT HI MDM: CPT | Mod: Z6 | Performed by: PHYSICIAN ASSISTANT

## 2018-08-04 PROCEDURE — 70450 CT HEAD/BRAIN W/O DYE: CPT | Mod: TC

## 2018-08-04 PROCEDURE — 85025 COMPLETE CBC W/AUTO DIFF WBC: CPT | Performed by: PHYSICIAN ASSISTANT

## 2018-08-04 PROCEDURE — 96360 HYDRATION IV INFUSION INIT: CPT | Performed by: PHYSICIAN ASSISTANT

## 2018-08-04 PROCEDURE — 71101 X-RAY EXAM UNILAT RIBS/CHEST: CPT | Mod: LT

## 2018-08-04 PROCEDURE — G0378 HOSPITAL OBSERVATION PER HR: HCPCS

## 2018-08-04 PROCEDURE — 93005 ELECTROCARDIOGRAM TRACING: CPT | Performed by: PHYSICIAN ASSISTANT

## 2018-08-04 PROCEDURE — 81003 URINALYSIS AUTO W/O SCOPE: CPT | Performed by: PHYSICIAN ASSISTANT

## 2018-08-04 PROCEDURE — 99220 ZZC INITIAL OBSERVATION CARE,LEVL III: CPT | Performed by: INTERNAL MEDICINE

## 2018-08-04 PROCEDURE — 36415 COLL VENOUS BLD VENIPUNCTURE: CPT | Performed by: PHYSICIAN ASSISTANT

## 2018-08-04 PROCEDURE — 85610 PROTHROMBIN TIME: CPT | Performed by: PHYSICIAN ASSISTANT

## 2018-08-04 PROCEDURE — 81025 URINE PREGNANCY TEST: CPT | Performed by: PHYSICIAN ASSISTANT

## 2018-08-04 PROCEDURE — 85730 THROMBOPLASTIN TIME PARTIAL: CPT | Performed by: PHYSICIAN ASSISTANT

## 2018-08-04 PROCEDURE — 80053 COMPREHEN METABOLIC PANEL: CPT | Performed by: PHYSICIAN ASSISTANT

## 2018-08-04 PROCEDURE — 70551 MRI BRAIN STEM W/O DYE: CPT

## 2018-08-04 PROCEDURE — 25000128 H RX IP 250 OP 636: Performed by: PHYSICIAN ASSISTANT

## 2018-08-04 PROCEDURE — 93010 ELECTROCARDIOGRAM REPORT: CPT | Performed by: INTERNAL MEDICINE

## 2018-08-04 RX ORDER — SODIUM CHLORIDE, SODIUM LACTATE, POTASSIUM CHLORIDE, CALCIUM CHLORIDE 600; 310; 30; 20 MG/100ML; MG/100ML; MG/100ML; MG/100ML
1000 INJECTION, SOLUTION INTRAVENOUS CONTINUOUS
Status: DISCONTINUED | OUTPATIENT
Start: 2018-08-04 | End: 2018-08-04 | Stop reason: HOSPADM

## 2018-08-04 RX ADMIN — SODIUM CHLORIDE, POTASSIUM CHLORIDE, SODIUM LACTATE AND CALCIUM CHLORIDE 1000 ML: 600; 310; 30; 20 INJECTION, SOLUTION INTRAVENOUS at 10:55

## 2018-08-04 ASSESSMENT — ENCOUNTER SYMPTOMS
FEVER: 0
EYE REDNESS: 0
ABDOMINAL PAIN: 0
HEADACHES: 1
NECK PAIN: 0
SHORTNESS OF BREATH: 0
NECK STIFFNESS: 0
DIFFICULTY URINATING: 0
CONFUSION: 1
ARTHRALGIAS: 0
COLOR CHANGE: 0

## 2018-08-04 NOTE — IP AVS SNAPSHOT
MRN:[id number]                      After Visit Summary   8/4/2018    Nori Lazaro    MRN: [id number]           Thank you!     Thank you for choosing [facility] for your care. Our goal is always to provide you with excellent care. Hearing back from our patients is one way we can continue to improve our services. Please take a few minutes to complete the written survey that you may receive in the mail after you visit with us. Thank you!        Patient Information     Date Of Birth          1965        Designated Caregiver       Most Recent Value    Caregiver    Will someone help with your care after discharge? no [pt has spouse to help if needed]      About your hospital stay     You were admitted on:  August 4, 2018 You last received care in the:  [facility]    You were discharged on:  August 4, 2018        Reason for your hospital stay       Concussion                  Who to Call     For medical emergencies, please call 911.  For non-urgent questions about your medical care, please call your primary care provider or clinic, [phone]          Attending Provider     Provider Specialty    Billy Velarde PA-C Physician Assistant    Yessy Elmore MD Internal Medicine       Primary Care Provider Office Phone # Fax #    Liliana Colindres [phone]      After Care Instructions     Activity       Your activity upon discharge: activity as tolerated            Diet       Follow this diet upon discharge: Orders Placed This Encounter     Regular diet            Discharge Instructions       No ASA or NSAIDs or OTC herbal supplements.                  Follow-up Appointments     Follow-up and recommended labs and tests        Follow up with primary care provider, Liliana Levy, within 7 days for hospital follow- up.  The following labs/tests are recommended: Follow upon CTH and evaluate post-consussion.                  Future tests  "that were ordered for you     CT Head w/o Contrast                 Pending Results     No orders found from 8/2/2018 to 8/5/2018.            Statement of Approval     Ordered          08/04/18 1743  I have reviewed and agree with all the recommendations and orders detailed in this document.  EFFECTIVE NOW     Approved and electronically signed by:  Yessy Elmore MD           08/04/18 1726  I have reviewed and agree with all the recommendations and orders detailed in this document.  EFFECTIVE NOW     Approved and electronically signed by:  Yessy Elmore MD             Admission Information     Date & Time Provider Department Dept. Phone    8/4/2018 Yessy Elmore MD [facility] [phone]      Your Vitals Were     Blood Pressure Pulse Temperature Respirations Height Weight    132/69 94 99.1  F (37.3  C) (Tympanic) 18 1.626 m (5' 4.02\") 64.1 kg (141 lb 5 oz)    Pulse Oximetry BMI (Body Mass Index)                100% 24.24 kg/m2          [employer] Information     Site Tour gives you secure access to your electronic health record. If you see a primary care provider, you can also send messages to your care team and make appointments. If you have questions, please call your primary care clinic.  If you do not have a primary care provider, please call [phone] and they will assist you.        Care EveryWhere ID     This is your Care EveryWhere ID. This could be used by other organizations to access your [facility] medical records  [id number]        Equal Access to Services     ROSHAN QUIÑONES : Hadii milena Guzman, wageorgeda luqadaha, qaybta kaalmasammy you. So [facility] [phone].    ATENCIÓN: Si habla español, tiene a uriarte disposición servicios gratuitos de asistencia lingüística. Llame al [phone].    We comply with applicable federal civil rights laws and [place] laws. We do not discriminate on the basis of race, color, national origin, " age, disability, sex, sexual orientation, or gender identity.               Review of your medicines      CONTINUE these medicines which have NOT CHANGED        Dose / Directions    ALGAL OMEGA-3  MG capsule   Generic drug:  Docosahexaenoic Acid        daily   Refills:  0       cholecalciferol 1000 UNIT tablet   Commonly known as:  vitamin D3        daily   Refills:  0       Glucosamine Sulfate 750 MG Tabs        daily   Refills:  0       MELATONIN PO   Indication:  May take up to 3mg   Used for:  Left knee pain, unspecified chronicity        Dose:  1 mg   Take 1 mg by mouth   Refills:  0       METHYL B-12 1000 MCG Lozg   Generic drug:  Methylcobalamin        daily   Refills:  0       UNABLE TO FIND        Calm Magnesium- 2 teaspoons daily   Refills:  0                Protect others around you: Learn how to safely use, store and throw away your medicines at www.disposemymeds.org.             Medication List: This is a list of all your medications and when to take them. Check marks below indicate your daily home schedule. Keep this list as a reference.      Medications           Morning Afternoon Evening Bedtime As Needed    ALGAL OMEGA-3  MG capsule   daily   Generic drug:  Docosahexaenoic Acid                                cholecalciferol 1000 UNIT tablet   Commonly known as:  vitamin D3   daily                                Glucosamine Sulfate 750 MG Tabs   daily                                MELATONIN PO   Take 1 mg by mouth                                METHYL B-12 1000 MCG Lozg   daily   Generic drug:  Methylcobalamin                                UNABLE TO FIND   Calm Magnesium- 2 teaspoons daily

## 2018-08-04 NOTE — PROGRESS NOTES
" NS ADMISSION NOTE    Patient admitted to room 311 at approximately 1505 via wheel chair from emergency room. Patient was accompanied by spouse.     Verbal SBAR report received from VENITA Alva prior to patient arrival.     Patient trasferred to bed via self. Patient alert and oriented X 3. The patient is not having any pain. 0-10 Pain Scale: 1. Admission vital signs: Blood pressure 132/69, pulse 94, temperature 99.1  F (37.3  C), temperature source Tympanic, resp. rate 18, height 1.626 m (5' 4.02\"), weight 64.1 kg (141 lb 5 oz), SpO2 100 %. Patient and spouse were oriented to plan of care, call light, bed controls, tv, telephone, bathroom and visiting hours.     Patient is alert, follows commands, continues to ask same questions repeatedly about what happened and how it happened.  Spouse at bedside and providing reassurance and redirection.  All other neuro exam is negative. VSS      Risk Assessment    The following safety risks were identified during admission: fall. Yellow risk band applied: YES.     Skin Initial Assessment    This writer admitted this patient and completed a full skin assessment and Yves score in the Adult PCS flowsheet. Appropriate interventions initiated as needed.     Skin  Skin WDL:  WDL except, all  Skin Integrity: abrasion(s) (on legs)    Yves Risk Assessment  Sensory Perception: 4-->no impairment  Moisture: 4-->rarely moist  Activity: 4-->walks frequently  Mobility: 4-->no limitation  Nutrition: 3-->adequate  Friction and Shear: 3-->no apparent problem  Yves Score: 22    Gricelda Heart    "

## 2018-08-04 NOTE — IP AVS SNAPSHOT
[facility]    [address]    [place] [address]    Phone:  [phone]    Fax:  [phone]                                       After Visit Summary   8/4/2018    Nori Lazaro    MRN: [id number]           After Visit Summary Signature Page     I have received my discharge instructions, and my questions have been answered. I have discussed any challenges I see with this plan with the nurse or doctor.    ..........................................................................................................................................  Patient/Patient Representative Signature      ..........................................................................................................................................  Patient Representative Print Name and Relationship to Patient    ..................................................               ................................................  Date                                            Time    ..........................................................................................................................................  Reviewed by Signature/Title    ...................................................              ..............................................  Date                                                            Time

## 2018-08-04 NOTE — PROGRESS NOTES
[place] [facility] DISCHARGE NOTE    Patient discharged to home at 6:17 PM via wheel chair. Accompanied by spouse and staff. Discharge instructions reviewed with patient and spouse, opportunity offered to ask questions. Prescriptions - None ordered for discharge. All belongings sent with patient.    Patient continues to be confused and asking over and over what happened.  MD has been in room three different times explaining due to patient continues to forget what was said.   is getting frustrated as he has made up his mind with patient that they are going to be discharged and go to their hospital near home in the [facility].  The more patient tries to decide what to do the more upset, anxious and tearful she becomes.  Spouse decides that MD will come back to room to talk with patient one more time and then while patient can remember and understand and in agreement with being discharged patient will be discharged in care of the  who verbalizes understanding of discharge instructions and that he will bring patient to hospital in [facility].  Spouse declines last set of vital signs to be taken at time of discharge.      Spouse given print out of signs and symptoms of concussion to have patient be seen in ED on way to [facility] if patient should start to show other s/s besides the confusion/short term memory loss that is the only symptom at time of discharge.  Neuro assessment intact besides the confusion.     Patient discharged via wheelchair and personal vehicle driven by her .  Spouse given all paperwork and spouse signs paperwork stating he verbalizes understanding of discharge instructions with no further questions.  Patient was in agreement with being discharged at time of discharge.      Gricelda Heart
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
## 2018-08-04 NOTE — PHARMACY - DISCHARGE MEDICATION RECONCILIATION
Pharmacy:  Discharge Counseling and Medication Reconciliation    Nori Lazaro  [address] [place] [address]  [phone] (home) [phone] (work)  53 year old female  PCP: Liliana Luciano    Allergies: Review of patient's allergies indicates no known allergies.    Discharge Counseling:    Pharmacist did not meet with patient at time of discharge. There were no changes to the patient's medications.     Discharge Medication Reconciliation:    Jessica Motta RPH has reviewed the patient's discharge medication orders and has compared them to the inpatient medication administration record and to what the patient was taking prior to admission - any discrepancies have been resolved.    Thank you for the consult.    Jessica Motta RPH........August 4, 2018 5:48 PM
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
## 2019-10-01 ENCOUNTER — MYC MEDICAL ADVICE (OUTPATIENT)
Dept: RHEUMATOLOGY | Facility: CLINIC | Age: 54
End: 2019-10-01

## 2019-10-01 ENCOUNTER — HEALTH MAINTENANCE LETTER (OUTPATIENT)
Age: 54
End: 2019-10-01

## 2019-10-02 NOTE — TELEPHONE ENCOUNTER
Is Ms. Lazaro planning on follow-up appointment for positive MAGALY, or does she have new symptoms?  If she is planning on follow-up for positive MAGALY, I recommend creatinine, CBC with platelets, and urinalysis be done prior to the visit.

## 2019-10-02 NOTE — TELEPHONE ENCOUNTER
Called patient to ask her whether she wants a follow up appointment but left a message to call back and told her I would leave her a MyChart message.    Chelsie Morrell, BSN RN   Rheumatology Clinic Nurse   [facility]

## 2019-10-14 RX ORDER — LIDOCAINE 40 MG/G
CREAM TOPICAL
Status: CANCELLED | OUTPATIENT
Start: 2019-10-14

## 2019-10-14 RX ORDER — ONDANSETRON 2 MG/ML
4 INJECTION INTRAMUSCULAR; INTRAVENOUS
Status: CANCELLED | OUTPATIENT
Start: 2019-10-14

## 2019-10-15 ENCOUNTER — HOSPITAL ENCOUNTER (OUTPATIENT)
Facility: CLINIC | Age: 54
Discharge: HOME | End: 2019-10-15
Attending: COLON & RECTAL SURGERY | Admitting: COLON & RECTAL SURGERY
Payer: COMMERCIAL

## 2019-10-15 VITALS
WEIGHT: 139 LBS | RESPIRATION RATE: 16 BRPM | HEART RATE: 59 BPM | DIASTOLIC BLOOD PRESSURE: 81 MMHG | OXYGEN SATURATION: 89 % | BODY MASS INDEX: 23.73 KG/M2 | SYSTOLIC BLOOD PRESSURE: 114 MMHG | HEIGHT: 64 IN

## 2019-10-15 LAB — COLONOSCOPY: NORMAL

## 2019-10-15 PROCEDURE — 99153 MOD SED SAME PHYS/QHP EA: CPT | Performed by: COLON & RECTAL SURGERY

## 2019-10-15 PROCEDURE — 25000128 H RX IP 250 OP 636: Performed by: COLON & RECTAL SURGERY

## 2019-10-15 PROCEDURE — G0500 MOD SEDAT ENDO SERVICE >5YRS: HCPCS | Performed by: COLON & RECTAL SURGERY

## 2019-10-15 PROCEDURE — 88305 TISSUE EXAM BY PATHOLOGIST: CPT | Performed by: COLON & RECTAL SURGERY

## 2019-10-15 PROCEDURE — 88305 TISSUE EXAM BY PATHOLOGIST: CPT | Mod: 26 | Performed by: COLON & RECTAL SURGERY

## 2019-10-15 PROCEDURE — 45380 COLONOSCOPY AND BIOPSY: CPT | Performed by: COLON & RECTAL SURGERY

## 2019-10-15 RX ORDER — ONDANSETRON 2 MG/ML
4 INJECTION INTRAMUSCULAR; INTRAVENOUS EVERY 6 HOURS PRN
Status: CANCELLED | OUTPATIENT
Start: 2019-10-15

## 2019-10-15 RX ORDER — FLUMAZENIL 0.1 MG/ML
0.2 INJECTION, SOLUTION INTRAVENOUS
Status: CANCELLED | OUTPATIENT
Start: 2019-10-15 | End: 2019-10-15

## 2019-10-15 RX ORDER — DIPHENHYDRAMINE HYDROCHLORIDE 50 MG/ML
25 INJECTION INTRAMUSCULAR; INTRAVENOUS EVERY 4 HOURS PRN
Status: CANCELLED | OUTPATIENT
Start: 2019-10-15

## 2019-10-15 RX ORDER — PROCHLORPERAZINE MALEATE 10 MG
10 TABLET ORAL EVERY 6 HOURS PRN
Status: CANCELLED | OUTPATIENT
Start: 2019-10-15

## 2019-10-15 RX ORDER — FENTANYL CITRATE 50 UG/ML
INJECTION, SOLUTION INTRAMUSCULAR; INTRAVENOUS PRN
Status: DISCONTINUED | OUTPATIENT
Start: 2019-10-15 | End: 2019-10-15 | Stop reason: HOSPADM

## 2019-10-15 RX ORDER — NALOXONE HYDROCHLORIDE 0.4 MG/ML
.1-.4 INJECTION, SOLUTION INTRAMUSCULAR; INTRAVENOUS; SUBCUTANEOUS
Status: CANCELLED | OUTPATIENT
Start: 2019-10-15 | End: 2019-10-16

## 2019-10-15 RX ORDER — DIPHENHYDRAMINE HCL 25 MG
25 CAPSULE ORAL EVERY 4 HOURS PRN
Status: CANCELLED | OUTPATIENT
Start: 2019-10-15

## 2019-10-15 RX ORDER — ONDANSETRON 4 MG/1
4 TABLET, ORALLY DISINTEGRATING ORAL EVERY 6 HOURS PRN
Status: CANCELLED | OUTPATIENT
Start: 2019-10-15

## 2019-10-15 ASSESSMENT — MIFFLIN-ST. JEOR: SCORE: 1205.5

## 2019-10-15 NOTE — H&P
Pre-Endoscopy History and Physical     Nori Lazaro MRN# [id number]   YOB: 1965 Age: 54 year old     Date of Procedure: 10/15/19  Primary care provider: Liliana Luciano  Type of Endoscopy: Colonoscopy  Reason for Procedure: screening, personal history of polyps  Type of Anesthesia Anticipated: Moderate Sedation    HPI:    Nori is a 54 year old female who will be undergoing the above procedure.      A history and physical has been performed. The patient's medications and allergies have been reviewed. The risks and benefits of the procedure and the sedation options and risks were discussed with the patient.  All questions were answered and informed consent was obtained.      She denies a personal or family history of anesthesia complications or bleeding disorders.     No Known Allergies     No current facility-administered medications on file prior to encounter.   cholecalciferol (VITAMIN D3) 1000 UNIT tablet, daily   Docosahexaenoic Acid (ALGAL OMEGA-3 DHA) 200 MG capsule, daily   Glucosamine Sulfate 750 MG TABS, daily   MELATONIN PO, Take 1 mg by mouth  Methylcobalamin (METHYL B-12) 1000 MCG LOZG, daily   UNABLE TO FIND, Calm Magnesium- 2 teaspoons daily        Patient Active Problem List   Diagnosis     Major depressive disorder, single episode, mild (H)     Endometrial polyp     Right knee pain, unspecified chronicity     Hip pain, right     History of basal cell carcinoma     History of dysplastic nevus     Left knee pain, unspecified chronicity     Concussion        Past Medical History:   Diagnosis Date     Endometrial polyp      Hx of skin cancer, basal cell 2015     Other and unspecified hyperlipidemia      PONV (postoperative nausea and vomiting)     In the past.        Past Surgical History:   Procedure Laterality Date     BIOPSY OF SKIN LESION       BIOPSY SYNOVIUM KNEE Left 6/6/2018    Procedure: BIOPSY SYNOVIUM KNEE;;  Surgeon: Leo Bar MD;  Location:  OR       PELVIS/HIP JOINT SURGERY UNLISTED  6/2012    hamstring tear /reattachment     DILATION AND CURETTAGE, HYSTEROSCOPY DIAGNOSTIC, COMBINED  8/13/2014    Procedure: COMBINED DILATION AND CURETTAGE, HYSTEROSCOPY DIAGNOSTIC;  Surgeon: Liliana Luciano MD;  [facility]     EXCISE MASS LOWER EXTREMITY Left 6/6/2018    Procedure: EXCISE MASS LOWER EXTREMITY;  Excision Biopsy Of Left Knee, Synovial Biopsy Left Knee;  Surgeon: Leo Bar MD;  Location:  OR       Social History     Tobacco Use     Smoking status: Never Smoker     Smokeless tobacco: Never Used   Substance Use Topics     Alcohol use: Yes     Comment: on average 1 glass of wine or 1 beer a week       Family History   Problem Relation Age of Onset     Hypertension Father      Arthritis Father      Alcoholism Father      Diabetes Paternal Grandmother      Arthritis Paternal Grandmother      Heart Disease Paternal Grandmother      Lipids Paternal Grandmother      Coronary Artery Disease Paternal Grandmother      Breast Cancer Mother      Alcohol/Drug Mother      Anesthesia Reaction Mother      Neurologic Disorder Mother      Lipids Mother      Osteoporosis Mother      Migraines Mother      Alcoholism Mother      Arthritis Maternal Grandmother      Eye Disorder Maternal Grandmother      Heart Disease Maternal Grandmother      Osteoporosis Maternal Grandmother      Depression Sister      Depression Maternal Grandfather      Mental Illness Maternal Grandfather      Heart Disease Paternal Grandfather      Psychotic Disorder Paternal Grandfather      Coronary Artery Disease Paternal Grandfather      Other Cancer Paternal Grandfather         lung     Psychotic Disorder Sister      Cancer Other         lung cancer - paternal grandfather (smoker)     Mental Illness Sister        REVIEW OF SYSTEMS:     5 point ROS negative except as noted above in HPI, including Gen., Resp., CV, GI &  system review.      PHYSICAL EXAM:   There were no vitals taken for  "this visit. Estimated body mass index is 24.24 kg/m  as calculated from the following:    Height as of 8/4/18: 1.626 m (5' 4.02\").    Weight as of 8/4/18: 64.1 kg (141 lb 5 oz).   GENERAL APPEARANCE: healthy and alert  MENTAL STATUS: alert  AIRWAY EXAM: Mallampatti Class II (visualization of the soft palate, fauces, and uvula)  RESP: lungs clear to auscultation - no rales, rhonchi or wheezes  CV: normal S1 S2, no S3 or S4      IMPRESSION   ASA Class 2 - Mild systemic disease        PLAN:     Plan for colonoscopy. We discussed the risks, benefits and alternatives and the patient wished to proceed.    The above has been forwarded to the consulting provider.      Chelsie Mckeon MD  [facility]  Phone: [phone]  Fax: [phone]  October 15, 2019    "

## 2019-10-15 NOTE — OP NOTE
See Provation Note In Chart    Chelsie Mckeon MD  [facility]  Office Phone # [phone]

## 2019-10-16 LAB — COPATH REPORT: NORMAL

## 2020-10-26 ENCOUNTER — HOSPITAL ENCOUNTER (OUTPATIENT)
Dept: BONE DENSITY | Facility: CLINIC | Age: 55
Discharge: HOME | End: 2020-10-26
Attending: OBSTETRICS & GYNECOLOGY | Admitting: OBSTETRICS & GYNECOLOGY
Payer: COMMERCIAL

## 2020-10-26 DIAGNOSIS — Z13.820 SCREENING FOR OSTEOPOROSIS: ICD-10-CM

## 2020-10-26 PROCEDURE — 77080 DXA BONE DENSITY AXIAL: CPT

## 2020-11-12 ENCOUNTER — VIRTUAL VISIT (OUTPATIENT)
Dept: FAMILY MEDICINE | Facility: OTHER | Age: 55
End: 2020-11-12
Payer: COMMERCIAL

## 2020-11-12 PROCEDURE — 99421 OL DIG E/M SVC 5-10 MIN: CPT | Performed by: NURSE PRACTITIONER

## 2020-11-12 NOTE — PROGRESS NOTES
"Date: 2020 08:06:57  Clinician: Sunitha Porras  Clinician NPI: [id number]  Patient: Nori aLzaro  Patient : 1965  Patient Address: [address] [place], [place] [address] [address]  Patient Phone: [phone]  Visit Protocol: URI  Patient Summary:  Nori is a 55 year old ( : 1965 ) female who initiated a [facility] Visit for COVID-19 (Coronavirus) evaluation and screening. When asked the question \"Please sign me up to receive news, health information and promotions from [facility].\", Nori responded \"No\".    Nori states her symptoms started 1-2 days ago.   Her symptoms consist of myalgia, malaise, a sore throat, a headache, a cough, and nasal congestion.   Symptom details     Nasal secretions: The color of her mucus is clear.    Cough: Nori coughs a few times an hour and her cough is not more bothersome at night. Phlegm does not come into her throat when she coughs. She does not believe her cough is caused by post-nasal drip.     Sore throat: Nori reports having mild throat pain (1-3 on a 10 point pain scale), does not have exudate on her tonsils, and can swallow liquids. She is not sure if the lymph nodes in her neck are enlarged. A rash has not appeared on the skin since the sore throat started.     Headache: She states the headache is mild (1-3 on a 10 point pain scale).      Nori denies having vomiting, rhinitis, facial pain or pressure, chills, teeth pain, ageusia, diarrhea, ear pain, wheezing, fever, nausea, and anosmia. She also denies taking antibiotic medication in the past month and having recent facial or sinus surgery in the past 60 days. She is not experiencing dyspnea.   Precipitating events  Nori is not sure if she has been exposed to someone with strep throat. She has not recently been exposed to someone with influenza. Nori has been in close contact with the following high risk individuals: adults 65 or older.   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 In the past 14 days, Nori has not worked or volunteered at a healthcare facility or group living setting.   In the past 14 days, she also has not lived in a congregate living setting.   Nori has not had a close contact with a laboratory-confirmed COVID-19 patient within 14 days of symptom onset.    Since December 2019, Nori has not been tested for COVID-19 and has had upper respiratory infection (URI) or influenza-like illness.      Date(s) of previous URI or influenza-like illness (free-text): regular cold - Im a teacher     Symptoms Nori experienced during previous URI or influenza-like illness as reported by the patient (free-text): snuffy nose, run down        Pertinent medical history  Nori typically gets a yeast infection when she takes antibiotics. She is not sure if she has used fluconazole (Diflucan) to treat previous yeast infections.   Nori does not need a return to work/school note.   Weight: 141 lbs   Nori does not smoke or use smokeless tobacco.   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 This illness can cause fever, cough and trouble breathing. Many people get a mild case and get better on their own. Some people  "can get very sick.  What should I do?  We would like to test you for this virus.   1. Please call [phone] to schedule your visit. Explain that you were referred by [facility] to have a COVID-19 test. Be ready to [facility].  * If you need to schedule in [facility] please call [phone] or for [employer] employees please call [phone].  * If you need to schedule in the [place] area please call [phone]. [place] employees call [phone].  The following will serve as your written order for this COVID Test, ordered by me, for the indication of suspected COVID [Z20.828]: The test will be ordered in [employer], our electronic health record, after you are scheduled. It will show as ordered and authorized by Abdullahi Melvin MD.  Order: COVID-19 (Coronavirus) PCR for SYMPTOMATIC testing from [facility].   2. When it's time for your COVID test:  Stay at least 6 feet away from others. (If someone will drive you to your test, stay in the backseat, as far away from the  as you can.)   Cover your mouth and nose with a mask, tissue or washcloth.  Go straight to the testing site. Don't make any stops on the way there or back.      3.Starting now: Stay home and away from others (self-isolate) until:   You've had no fever---and no medicine that reduces fever---for one full day (24 hours). And...   Your other symptoms have gotten better. For example, your cough or breathing has improved. And...   At least 10 days have passed since your symptoms started.       During this time, don't leave the house except for testing or medical care.   Stay in your own room, even for meals. Use your own bathroom if you can.   Stay away from others in your home. No hugging, kissing or shaking hands. No visitors.  Don't go to work, school or anywhere else.    Clean \"high touch\" surfaces often (doorknobs, counters, handles, etc.). Use a household cleaning spray or wipes. You'll find a full list of  on the EPA " website: www.epa.gov/pesticide-registration/list-n-disinfectants-use-against-sars-cov-2.   Cover your mouth and nose with a mask, tissue or washcloth to avoid spreading germs.  Wash your hands and face often. Use soap and water.  Caregivers in these groups are at risk for severe illness due to COVID-19:  o People 65 years and older  o People who live in a nursing home or long-term care facility  o People with chronic disease (lung, heart, cancer, diabetes, kidney, liver, immunologic)  o People who have a weakened immune system, including those who:   Are in cancer treatment  Take medicine that weakens the immune system, such as corticosteroids  Had a bone marrow or organ transplant  Have an immune deficiency  Have poorly controlled HIV or AIDS  Are obese (body mass index of 40 or higher)  Smoke regularly   o Caregivers should wear gloves while washing dishes, handling laundry and cleaning bedrooms and bathrooms.  o Use caution when washing and drying laundry: Don't shake dirty laundry, and use the warmest water setting that you can.  o For more tips, go to www.cdc.gov/coronavirus/2019-ncov/downloads/10Things.pdf.    4.Sign up for [employer]. We know it's scary to hear that you might have COVID-19. We want to track your symptoms to make sure you're okay over the next 2 weeks. Please look for an email from [employer]---this is a free, online program that we'll use to keep in touch. To sign up, follow the link in the email. Learn more at [email or web address]  How can I take care of myself?   Get lots of rest. Drink extra fluids (unless a doctor has told you not to).   Take Tylenol (acetaminophen) for fever or pain. If you have liver or kidney problems, ask your family doctor if it's okay to take Tylenol.   Adults can take either:    650 mg (two 325 mg pills) every 4 to 6 hours, or...   1,000 mg (two 500 mg pills) every 8 hours as needed.    Note: Don't take more than 3,000 mg in one day. Acetaminophen  is found in many medicines (both prescribed and over-the-counter medicines). Read all labels to be sure you don't take too much.   For children, check the Tylenol bottle for the right dose. The dose is based on the child's age or weight.    If you have other health problems (like cancer, heart failure, an organ transplant or severe kidney disease): Call your specialty clinic if you don't feel better in the next 2 days.       Know when to call 911. Emergency warning signs include:    Trouble breathing or shortness of breath Pain or pressure in the chest that doesn't go away Feeling confused like you haven't felt before, or not being able to wake up Bluish-colored lips or face.  Where can I get more information?    [facility] -- About COVID-19: www.[employer].org/covid19/   CDC -- What to Do If You're Sick: www.cdc.gov/coronavirus/2019-ncov/about/steps-when-sick.html   CDC -- Ending Home Isolation: www.cdc.gov/coronavirus/2019-ncov/hcp/disposition-in-home-patients.html   CDC -- Caring for Someone: www.cdc.gov/coronavirus/2019-ncov/if-you-are-sick/care-for-someone.html   [facility] -- Interim Guidance for Hospital Discharge to Home: www.health.[facility].[place]/diseases/coronavirus/hcp/hospdischarge.pdf   [facility] clinical trials (COVID-19 research studies): clinicalaffairs.[facility]-clinical-trials    Below are the COVID-19 hotlines at the [facility] ([facility]). Interpreters are available.    For health questions: Call 326-257-8018 or 1-578.904.4329 (7 a.m. to 7 p.m.) For questions about schools and childcare: Call [phone] or 1-278.895.8681 (7 a.m. to 7 p.m.)    Diagnosis: Cough  Diagnosis ICD: R05

## 2021-01-15 ENCOUNTER — HEALTH MAINTENANCE LETTER (OUTPATIENT)
Age: 56
End: 2021-01-15

## 2021-09-04 ENCOUNTER — HEALTH MAINTENANCE LETTER (OUTPATIENT)
Age: 56
End: 2021-09-04

## 2021-10-11 ENCOUNTER — HOSPITAL ENCOUNTER (OUTPATIENT)
Dept: MAMMOGRAPHY | Facility: CLINIC | Age: 56
Discharge: HOME | End: 2021-10-11
Attending: OBSTETRICS & GYNECOLOGY | Admitting: OBSTETRICS & GYNECOLOGY
Payer: COMMERCIAL

## 2021-10-11 DIAGNOSIS — Z12.31 SCREENING MAMMOGRAM, ENCOUNTER FOR: ICD-10-CM

## 2021-10-11 PROCEDURE — 77063 BREAST TOMOSYNTHESIS BI: CPT

## 2022-02-19 ENCOUNTER — HEALTH MAINTENANCE LETTER (OUTPATIENT)
Age: 57
End: 2022-02-19

## 2022-10-16 ENCOUNTER — HEALTH MAINTENANCE LETTER (OUTPATIENT)
Age: 57
End: 2022-10-16

## 2022-10-28 ENCOUNTER — HOSPITAL ENCOUNTER (OUTPATIENT)
Dept: MAMMOGRAPHY | Facility: CLINIC | Age: 57
Discharge: HOME | End: 2022-10-28
Admitting: OBSTETRICS & GYNECOLOGY
Payer: COMMERCIAL

## 2022-10-28 DIAGNOSIS — Z12.31 VISIT FOR SCREENING MAMMOGRAM: ICD-10-CM

## 2022-10-28 PROCEDURE — 77067 SCR MAMMO BI INCL CAD: CPT

## 2022-11-01 ENCOUNTER — HOSPITAL ENCOUNTER (OUTPATIENT)
Dept: BONE DENSITY | Facility: CLINIC | Age: 57
Discharge: HOME | End: 2022-11-01
Attending: SPECIALIST
Payer: COMMERCIAL

## 2022-11-01 ENCOUNTER — HOSPITAL ENCOUNTER (OUTPATIENT)
Dept: MAMMOGRAPHY | Facility: CLINIC | Age: 57
Discharge: HOME | End: 2022-11-01
Attending: INTERNAL MEDICINE
Payer: COMMERCIAL

## 2022-11-01 DIAGNOSIS — R92.8 ABNORMAL MAMMOGRAM: ICD-10-CM

## 2022-11-01 DIAGNOSIS — M81.0 OSTEOPOROSIS: ICD-10-CM

## 2022-11-01 PROCEDURE — 76642 ULTRASOUND BREAST LIMITED: CPT | Mod: LT

## 2022-11-01 PROCEDURE — 77080 DXA BONE DENSITY AXIAL: CPT

## 2022-12-06 ENCOUNTER — LAB REQUISITION (OUTPATIENT)
Dept: LAB | Facility: CLINIC | Age: 57
End: 2022-12-06

## 2022-12-06 DIAGNOSIS — Z01.419 ENCOUNTER FOR GYNECOLOGICAL EXAMINATION (GENERAL) (ROUTINE) WITHOUT ABNORMAL FINDINGS: ICD-10-CM

## 2022-12-06 PROCEDURE — 87624 HPV HI-RISK TYP POOLED RSLT: CPT | Performed by: OBSTETRICS & GYNECOLOGY

## 2022-12-06 PROCEDURE — G0123 SCREEN CERV/VAG THIN LAYER: HCPCS | Performed by: OBSTETRICS & GYNECOLOGY

## 2022-12-09 LAB
BKR LAB AP GYN ADEQUACY: NORMAL
BKR LAB AP GYN INTERPRETATION: NORMAL
BKR LAB AP HPV REFLEX: NORMAL
BKR LAB AP LMP: NORMAL
BKR LAB AP PREVIOUS ABNL DX: NORMAL
BKR LAB AP PREVIOUS ABNORMAL: NORMAL
PATH REPORT.COMMENTS IMP SPEC: NORMAL
PATH REPORT.COMMENTS IMP SPEC: NORMAL
PATH REPORT.RELEVANT HX SPEC: NORMAL

## 2022-12-12 LAB
HUMAN PAPILLOMA VIRUS 16 DNA: NEGATIVE
HUMAN PAPILLOMA VIRUS 18 DNA: NEGATIVE
HUMAN PAPILLOMA VIRUS FINAL DIAGNOSIS: NORMAL
HUMAN PAPILLOMA VIRUS OTHER HR: NEGATIVE

## 2023-04-01 ENCOUNTER — HEALTH MAINTENANCE LETTER (OUTPATIENT)
Age: 58
End: 2023-04-01

## 2023-05-02 ENCOUNTER — HOSPITAL ENCOUNTER (OUTPATIENT)
Dept: MAMMOGRAPHY | Facility: CLINIC | Age: 58
Discharge: HOME | End: 2023-05-02
Attending: OBSTETRICS & GYNECOLOGY
Payer: COMMERCIAL

## 2023-05-02 DIAGNOSIS — R92.8 BI-RADS CATEGORY 3 MAMMOGRAM RESULT: ICD-10-CM

## 2023-05-02 PROCEDURE — 77061 BREAST TOMOSYNTHESIS UNI: CPT | Mod: LT

## 2023-05-02 PROCEDURE — 76642 ULTRASOUND BREAST LIMITED: CPT | Mod: LT

## 2023-11-03 ENCOUNTER — HOSPITAL ENCOUNTER (OUTPATIENT)
Dept: MAMMOGRAPHY | Facility: CLINIC | Age: 58
Discharge: HOME | End: 2023-11-03
Attending: OBSTETRICS & GYNECOLOGY
Payer: COMMERCIAL

## 2023-11-03 DIAGNOSIS — R92.8 BI-RADS CATEGORY 3 MAMMOGRAM RESULT: ICD-10-CM

## 2023-11-03 PROCEDURE — 77066 DX MAMMO INCL CAD BI: CPT

## 2023-11-03 PROCEDURE — 76642 ULTRASOUND BREAST LIMITED: CPT | Mod: LT

## 2023-11-21 ENCOUNTER — TRANSFERRED RECORDS (OUTPATIENT)
Dept: HEALTH INFORMATION MANAGEMENT | Facility: CLINIC | Age: 58
End: 2023-11-21

## 2024-03-04 ENCOUNTER — TELEPHONE (OUTPATIENT)
Dept: ORTHOPEDICS | Facility: CLINIC | Age: 59
End: 2024-03-04
Payer: COMMERCIAL

## 2024-03-04 NOTE — TELEPHONE ENCOUNTER
Hello,    I'm with ortho con.  Pt of Dr. Bar for her knee 5 years ago.  Pt has seen [facility] and had an XR in the fall.  She is feeling the same symptoms she had in the past for her schwannoma and other issue.  She is asking to be seen.  She is ok with next available but per protocol I'm reaching out for you to review and schedule sooner.

## 2024-03-04 NOTE — TELEPHONE ENCOUNTER
- A call was placed to the patient.     - Xrays done at [facility] in the September or October of 2023.     - Has an increate in recent activity, did a new work out and the pain increased with this. Has decreased her activity since.     - Having some cramping in her calf during cycling workout.     - Can climb up but is having pain, coming down.     - Appointment scheduled for 3/18 @ 12:40. Address given.     - Patient verbalized understanding of plan and all questions were answered. Call back number to clinic was given and patient was told to call if they had an further questions.

## 2024-03-14 NOTE — TELEPHONE ENCOUNTER
Action March 14, 2024 10:38 AM MT   Action Taken Called the patient for more information, no answer, left a voicemail for the patient to callback. Sent a request to [facility] for records and imaging.      Action March 15, 2024 11:23 AM MT   Action Taken Patient called back and states she only saw [facility] one time and had an XRAY with them, no other specialists aside from seeing a rheumatologist,       DIAGNOSIS Leg/Knee Problems S/P Fall - Hx of Schwannoma   APPOINTMENT DATE: 03/18/2024   NOTES STATUS DETAILS   OFFICE NOTE from referring provider SELF 03/04/2024 - Telephone Encounter    Last Seen:  07/30/2018 - Leo Bar MD- [facility] Ortho   OFFICE NOTE from other specialist [facility]    Internal 11/21/2023 - Ismael Pop MD - [facility]    07/30/2018 - Daniel Gallegos MD - [facility] Rheumatology   OPERATIVE REPORT Internal 06/06/2018 - Excision Biopsy Of Left Knee, Synovial Biopsy Left Knee   LABS     MRI PACS [facility]:  04/17/2018 - Left Knee  04/17/2018 - Right Knee   XRAYS (IMAGES & REPORTS) PACS [facility]:  11/21/2023 - Bilateral Knee  [facility]:  03/23/2018 - Bilateral knee  03/23/2018- RT Knee   TUMOR     PATHOLOGY  Slides & report Internal Specimen #: [id number]  Collected: 6/6/2018  SPECIMEN(S):  A: Medial left knee  B: Left knee synovium biopsy

## 2024-03-15 NOTE — PROGRESS NOTES
"    [facility] (formerly [facility])[3] Physicians, Orthopaedic Oncology Surgery Consultation  by Leo Bar M.D.    Christie Dick MRN# [id number]    YOB: 1965     Requesting physician: No ref. provider found  Liliana Luciano   DX:  Schwannoma L medial knee soft tissue  R/o RA. Medial meniscal degeneration L knee        SURGERY:  6/6/18, Excision of medial knee schwannoma, L knee synovial biopsy ([place]) [facility]        Copath Report 06/06/2018  8:05 AM 88       Patient Name: CHRISTIE DICK   MR#: [id number]   Specimen #: [id number]   Collected: 6/6/2018   Received: 6/6/2018   Reported: 6/8/2018 17:44   Ordering Phy(s): LEO BAR     For improved result formatting, select 'View Enhanced Report Format' under    Linked Documents section.     ORIGINAL REPORT:     SPECIMEN(S):   A: Medial left knee   B: Left knee synovium biopsy     FINAL DIAGNOSIS:   A. Soft tissue, medial left knee, excision:   - Schwannoma     B. Left knee synovium biopsy:   - Synovial lipomatosis     I have personally reviewed all specimens and/or slides, including the   listed special stains, and used them   with my medical judgement to determine or confirm the final diagnosis.     Electronically signed out by:     Brielle Snow M.D., [facility]     CLINICAL HISTORY:   53-year-old female with a painful ovoid soft tissue mass measuring 2.6 x   1.8 x 1.0 cm identified beneath the   pedis tendons and the posterior lateral aspect of her medial femoral   condyle of the left knee.  Procedure:   Excisional biopsy and synovial biopsy.     GROSS:   A:  The specimen is received fresh with proper patient identification,   labeled \"left medial knee\".  The   specimen consists of a yellow-tan soft tissue measuring 2.6 x 1.5 x 1.2   cm.  The specimen is serially   sectioned and a representative section is submitted for frozen section and    resubmitted for permanent section   in cassette A1FS.  The remainder of the specimen is submitted entirely in " "  cassettes A2-A3. (Dictated by:   Kim Lazaro 6/6/2018 01:23 PM)     B:  The specimen is received in formalin with proper patient   identification, labeled \"left knee synovium   biopsy\".  The specimen consists of multiple tan-yellow soft tissues   measuring 1.9 x 1.5 x 0.5 cm in aggregate.    The specimen is submitted entirely in cassette B1. (Dictated by: Kim Lazrao 6/7/2018 10:05 AM)     INTRAOPERATIVE CONSULTATION:   Frozen section is performed on part A. Please refer to Epic Result History    for the Preliminary Intraoperative   Diagnosis.     MICROSCOPIC:   Microscopic examination is performed.     CPT Codes:   A: 61466-QQ1, 21553-PW   B: 23058-VX1     TESTING LAB LOCATION:   [address] [address] [phone]     COLLECTION SITE:   Client: [facility]   Location: [facility] (B)     Resident   [email or web address]               Assessment and Plan:   Assessment:  48-year-old female with history of left medial knee soft tissue schwannoma here with left knee pain.      Plan:  Recent x-rays performed in November 2023 were reviewed.  We discussed that the etiology of her knee pain is unclear.  Her exam and history is suggestive of osteoarthritis or another intra-articular pathology such as a meniscus tear.  Given this, in addition to her history of schwannoma, we recommended a new MRI of the left knee.  She wished to proceed with this.  We will have her follow-up virtually to discuss the results. If the MRI demonstrates any surgical pathologies, we will arrange for in personal follow up.  We did have an extensive discussion regarding physical therapy and exercises she could do with her  to optimize her conditioning and strength in her legs.  We discussed both eccentric and isometric exercises.  We also discussed low impact activities and swimming.  She prefers to do some kind " of impact activities to maintain her bone health and we discussed jumping rope as a way of optimizing this.    The patient was seen discussed with Dr. Bar who agrees with the above assessment and plan.     Nidia Peacock MD, PGY4  Orthopedic Surgery     Attending MD (Dr. Leo Bar) Attestation :  This patient was seen and evaluated by me including a history, exam, and interpretation of all imaging and/or lab data.  I formulated the treatment plan along with either a physician's assistant (JOSE) or training physician (resident/fellow), who also saw the patient. That individual or a scribe has documented the visit in the attached note which I approve.    Leo Bar MD  [facility] Professor  Oncology and Adult Reconstructive Surgery  Dept Orthopaedic Surgery, [facility]  [phone] office, [phone] pager  [email or web address]               History of Present Illness:   58 year old female  chief complaint: Left knee pain.    The patient reports an onset of left knee pain this past fall.  She denies any preceding trauma.  She describes her pain as pressure and locates it to the medial and posterior knee.  She says the knee feels full and it feels like she has cramps in her calf.  Pain worsens with cycling and going downstairs.  She notes associated intermittent swelling.  Denies any new numbness or tingling.  She notes she has persistent numbness over the anterior medial leg after her previous surgery.  She states she saw a provider at [facility] who obtained x-rays in November.  She was told she had mild arthritis and could consider corticosteroid joint.  She has tried activity modification with some relief.  Her symptoms have been progressing and she is not able to exercise and is now taking Tylenol PM every night in order to sleep.  She has concerned that her schwannoma has returned or she has some new intra-articular pathology.  She notes she did have a left knee medial meniscus tear that did not  require surgery in the past.    Current symptoms:  Problem: Left knee pain and Decreased mobility   Onset and duration: few months ago   Awakens from sleep due to sx's:  Yes  Precipitating Injury:  No    Other joints or sites painful:  No  Fever: No  Appetite change or weight loss: No  History of prior or existing cancer: Yes           Physical Exam:     EXAMINATION pertinent findings:   PSYCH: Pleasant, healthy-appearing, alert, oriented x3, cooperative. Normal mood and affect.  VITAL SIGNS: There were no vitals taken for this visit..  Reviewed nursing intake notes.   There is no height or weight on file to calculate BMI.  RESP: non labored breathing   ABD: benign, soft, non-tender, no acute peritoneal findings  SKIN: grossly normal   LYMPHATIC: grossly normal, no adenopathy, no extremity edema  NEURO: grossly normal , no motor deficits  VASCULAR: satisfactory perfusion of all extremities   MUSCULOSKELETAL:   Left lower extremity:   Well healed scar over medial knee.  Slight fullness over the pes.  No knee effusion.  Knee range of motion 0-140 degrees.  Stable to varus and valgus stress.  Lachman Ia.  Negative posterior drawer.  Positive Wesley with medial pain.  Positive Thessaly with medial pain.  Negative squat test.  Thigh circumference was measured at 48 cm and the same as the contralateral side.                   Data:   All laboratory data reviewed  All imaging studies reviewed by me  Mild medial joint space narrowing on the left without any significant osteophytes             DATA for DOCUMENTATION:         Past Medical History:     Patient Active Problem List   Diagnosis    Major depressive disorder, single episode, mild (H24)    Endometrial polyp    Right knee pain, unspecified chronicity    Hip pain, right    History of basal cell carcinoma    History of dysplastic nevus    Left knee pain, unspecified chronicity    Concussion     Past Medical History:   Diagnosis Date    Cancer (H)     melanoma     Concussion     Endometrial polyp     Hx of skin cancer, basal cell 2015    Other and unspecified hyperlipidemia     PONV (postoperative nausea and vomiting)     In the past.       Also see scanned health assessment forms.       Past Surgical History:     Past Surgical History:   Procedure Laterality Date    BIOPSY OF SKIN LESION      BIOPSY SYNOVIUM KNEE Left 6/6/2018    Procedure: BIOPSY SYNOVIUM KNEE;;  Surgeon: Leo Bar MD;  Location: [facility]    C PELVIS/HIP JOINT SURGERY UNLISTED  6/2012    hamstring tear /reattachment    COLONOSCOPY N/A 10/15/2019    Procedure: COLONOSCOPY, WITH POLYPECTOMY AND BIOPSY;  Surgeon: Chelsie Mckeon MD;  Location:  GI    DILATION AND CURETTAGE, HYSTEROSCOPY DIAGNOSTIC, COMBINED  8/13/2014    Procedure: COMBINED DILATION AND CURETTAGE, HYSTEROSCOPY DIAGNOSTIC;  Surgeon: Liliana Luciano MD;  Location:  SD    EXCISE MASS LOWER EXTREMITY Left 6/6/2018    Procedure: EXCISE MASS LOWER EXTREMITY;  Excision Biopsy Of Left Knee, Synovial Biopsy Left Knee;  Surgeon: Leo Bar MD;  Location: [facility]    REPAIR TENDON HAMSTRING              Social History:     Social History     Socioeconomic History    Marital status:      Spouse name: Not on file    Number of children: Not on file    Years of education: Not on file    Highest education level: Not on file   Occupational History    Not on file   Tobacco Use    Smoking status: Never    Smokeless tobacco: Never   Substance and Sexual Activity    Alcohol use: Yes     Comment: on average 1 glass of wine or 1 beer a week    Drug use: No    Sexual activity: Yes     Partners: Male     Birth control/protection: Post-menopausal   Other Topics Concern    Parent/sibling w/ CABG, MI or angioplasty before 65F 55M? Not Asked   Social History Narrative    Not on file     Social Determinants of Health     Financial Resource Strain: Not on file   Food Insecurity: Not on file   Transportation Needs: Not on file   Physical  Activity: Not on file   Stress: Not on file   Social Connections: Not on file   Interpersonal Safety: Not on file   Housing Stability: Not on file            Family History:       Family History   Problem Relation Age of Onset    Hypertension Father     Arthritis Father     Alcoholism Father     Diabetes Paternal Grandmother     Arthritis Paternal Grandmother     Heart Disease Paternal Grandmother     Lipids Paternal Grandmother     Coronary Artery Disease Paternal Grandmother     Breast Cancer Mother     Alcohol/Drug Mother     Anesthesia Reaction Mother     Neurologic Disorder Mother     Lipids Mother     Osteoporosis Mother     Migraines Mother     Alcoholism Mother     Arthritis Maternal Grandmother     Eye Disorder Maternal Grandmother     Heart Disease Maternal Grandmother     Osteoporosis Maternal Grandmother     Depression Sister     Depression Maternal Grandfather     Mental Illness Maternal Grandfather     Heart Disease Paternal Grandfather     Psychotic Disorder Paternal Grandfather     Coronary Artery Disease Paternal Grandfather     Other Cancer Paternal Grandfather         lung    Psychotic Disorder Sister     Cancer Other         lung cancer - paternal grandfather (smoker)    Mental Illness Sister             Medications:     Current Outpatient Medications   Medication Sig    cholecalciferol (VITAMIN D3) 1000 UNIT tablet daily     Docosahexaenoic Acid (ALGAL OMEGA-3 DHA) 200 MG capsule daily     Glucosamine Sulfate 750 MG TABS daily     MELATONIN PO Take 1 mg by mouth    Methylcobalamin (METHYL B-12) 1000 MCG LOZG daily     UNABLE TO FIND Calm Magnesium- 2 teaspoons daily     No current facility-administered medications for this visit.              Review of Systems:   A comprehensive 10 point review of systems (constitutional, ENT, cardiac, peripheral vascular, lymphatic, respiratory, GI, , Musculoskeletal, skin, Neurological) was performed and found to be negative except as described in this note.      See intake form completed by patient    Total combined visit time and work time before and after clinic visit on encounter date = 30 min       89 year old male with PMH of Afib (Not on eliquis per the wife), CABG, PPM, HTN, BPH, PMR, and dementia aaox1 per chart documentation, wife states that her  is usually aware of everyone in the family, he knows who the president is and she reports that last night he had a dream and was talking a lot this AM when the nursing aide came, she witnessed that the pt wasn't moving his R arm. She states that her son is a physician so she called him who told her to give aspirin. Pt per wife is able to go to the bathroom, w/ a walker and assistance from the nursing aide usually on his own. Pt is aphasic. 89 year old male with PMH of Afib (Not on eliquis per the wife), CABG, PPM, HTN, BPH, PMR, and dementia aaox1 per chart documentation, wife states that her  is usually aware of everyone in the family, he knows who the president is and she reports that last night he had a dream and was talking a lot this AM when the nursing aide came, she witnessed that the pt wasn't moving his R arm. She states that her son is a physician so she called him who told her to give aspirin. Pt per wife is able to go to the bathroom, w/ a walker and assistance from the nursing aide usually on his own. Pt is aphasic.    Patient found to have an acute left MCA infarct.  Given significant decline and clinical signs of an imminent dying process patient transitioned to inpatient hospice care for management of dyspnea and gurgling breath sounds.

## 2024-03-18 ENCOUNTER — PRE VISIT (OUTPATIENT)
Dept: ORTHOPEDICS | Facility: CLINIC | Age: 59
End: 2024-03-18

## 2024-03-18 ENCOUNTER — OFFICE VISIT (OUTPATIENT)
Dept: ORTHOPEDICS | Facility: CLINIC | Age: 59
End: 2024-03-18
Payer: COMMERCIAL

## 2024-03-18 VITALS — BODY MASS INDEX: 27.46 KG/M2 | WEIGHT: 160 LBS

## 2024-03-18 DIAGNOSIS — M25.562 LEFT KNEE PAIN, UNSPECIFIED CHRONICITY: Primary | ICD-10-CM

## 2024-03-18 PROCEDURE — 99204 OFFICE O/P NEW MOD 45 MIN: CPT | Mod: GC | Performed by: ORTHOPAEDIC SURGERY

## 2024-03-18 RX ORDER — LEVOTHYROXINE SODIUM 50 UG/1
TABLET ORAL
COMMUNITY
Start: 2023-06-01

## 2024-03-18 RX ORDER — ABALOPARATIDE 2000 UG/ML
INJECTION, SOLUTION SUBCUTANEOUS
COMMUNITY
Start: 2023-03-01

## 2024-03-18 ASSESSMENT — KOOS JR
BENDING TO THE FLOOR TO PICK UP OBJECT: MILD
HOW SEVERE IS YOUR KNEE STIFFNESS AFTER FIRST WAKING IN MORNING: MODERATE
STRAIGHTENING KNEE FULLY: SEVERE
STANDING UPRIGHT: MILD
TWISING OR PIVOTING ON KNEE: SEVERE
GOING UP OR DOWN STAIRS: SEVERE
KOOS JR SCORING: 54.84

## 2024-03-18 NOTE — LETTER
"    3/18/2024         RE: Christie Dick  [address] [facility] [address]          [facility], Orthopaedic Oncology Surgery Consultation  by Leo Bar M.D.    Christie Dick MRN# [id number]    YOB: 1965     Requesting physician: No ref. provider found  Jonel Colindres Liliana J   DX:  Schwannoma L medial knee soft tissue  R/o RA. Medial meniscal degeneration L knee        SURGERY:  6/6/18, Excision of medial knee schwannoma, L knee synovial biopsy (Yos[facility]any) [facility]        Copath Report 06/06/2018  8:05 AM 88       Patient Name: CHRISTIE DICK   MR#: [id number]   Specimen #: [id number]   Collected: 6/6/2018   Received: 6/6/2018   Reported: 6/8/2018 17:44   Ordering Phy(s): LEO BAR     For improved result formatting, select 'View Enhanced Report Format' under    Linked Documents section.     ORIGINAL REPORT:     SPECIMEN(S):   A: Medial left knee   B: Left knee synovium biopsy     FINAL DIAGNOSIS:   A. Soft tissue, medial left knee, excision:   - Schwannoma     B. Left knee synovium biopsy:   - Synovial lipomatosis     I have personally reviewed all specimens and/or slides, including the   listed special stains, and used them   with my medical judgement to determine or confirm the final diagnosis.     Electronically signed out by:     Brielle Snow M.D., [facility]     CLINICAL HISTORY:   53-year-old female with a painful ovoid soft tissue mass measuring 2.6 x   1.8 x 1.0 cm identified beneath the   pedis tendons and the posterior lateral aspect of her medial femoral   condyle of the left knee.  Procedure:   Excisional biopsy and synovial biopsy.     GROSS:   A:  The specimen is received fresh with proper patient identification,   labeled \"left medial knee\".  The   specimen consists of a yellow-tan soft tissue measuring 2.6 x 1.5 x 1.2   cm.  The specimen is serially   sectioned and a representative section is submitted for frozen section and    resubmitted for permanent " "section   in cassette A1FS.  The remainder of the specimen is submitted entirely in   cassettes A2-A3. (Dictated by:   Kim Lazrao 6/6/2018 01:23 PM)     B:  The specimen is received in formalin with proper patient   identification, labeled \"left knee synovium   biopsy\".  The specimen consists of multiple tan-yellow soft tissues   measuring 1.9 x 1.5 x 0.5 cm in aggregate.    The specimen is submitted entirely in cassette B1. (Dictated by: Kim Lazaro 6/7/2018 10:05 AM)     INTRAOPERATIVE CONSULTATION:   Frozen section is performed on part A. Please refer to Epic Result History    for the Preliminary Intraoperative   Diagnosis.     MICROSCOPIC:   Microscopic examination is performed.     CPT Codes:   A: 39138-XZ2, 34436-RW   B: 74418-SY1     TESTING LAB LOCATION:   [address] [address] [phone]     COLLECTION SITE:   Client: [facility]   Location: [facility] (B)     Resident   [email or web address]               Assessment and Plan:   Assessment:  48-year-old female with history of left medial knee soft tissue schwannoma here with left knee pain.      Plan:  Recent x-rays performed in November 2023 were reviewed.  We discussed that the etiology of her knee pain is unclear.  Her exam and history is suggestive of osteoarthritis or another intra-articular pathology such as a meniscus tear.  Given this, in addition to her history of schwannoma, we recommended a new MRI of the left knee.  She wished to proceed with this.  We will have her follow-up virtually to discuss the results. If the MRI demonstrates any surgical pathologies, we will arrange for in personal follow up.  We did have an extensive discussion regarding physical therapy and exercises she could do with her  to optimize her conditioning and strength in her legs.  We discussed both eccentric and isometric exercises.  " We also discussed low impact activities and swimming.  She prefers to do some kind of impact activities to maintain her bone health and we discussed jumping rope as a way of optimizing this.    The patient was seen discussed with Dr. Bar who agrees with the above assessment and plan.     Nidia Peacock MD, PGY4  Orthopedic Surgery     Attending MD (Dr. Leo Bar) Attestation :  This patient was seen and evaluated by me including a history, exam, and interpretation of all imaging and/or lab data.  I formulated the treatment plan along with either a physician's assistant (JOSE) or training physician (resident/fellow), who also saw the patient. That individual or a scribe has documented the visit in the attached note which I approve.    Leo Bar MD  [facility] Professor  Oncology and Adult Reconstructive Surgery  Dept Orthopaedic Surgery, [facility]  [phone] office, [phone] pager  [email or web address]               History of Present Illness:   58 year old female  chief complaint: Left knee pain.    The patient reports an onset of left knee pain this past fall.  She denies any preceding trauma.  She describes her pain as pressure and locates it to the medial and posterior knee.  She says the knee feels full and it feels like she has cramps in her calf.  Pain worsens with cycling and going downstairs.  She notes associated intermittent swelling.  Denies any new numbness or tingling.  She notes she has persistent numbness over the anterior medial leg after her previous surgery.  She states she saw a provider at [facility] who obtained x-rays in November.  She was told she had mild arthritis and could consider corticosteroid joint.  She has tried activity modification with some relief.  Her symptoms have been progressing and she is not able to exercise and is now taking Tylenol PM every night in order to sleep.  She has concerned that her schwannoma has returned or she has some new intra-articular  pathology.  She notes she did have a left knee medial meniscus tear that did not require surgery in the past.    Current symptoms:  Problem: Left knee pain and Decreased mobility   Onset and duration: few months ago   Awakens from sleep due to sx's:  Yes  Precipitating Injury:  No    Other joints or sites painful:  No  Fever: No  Appetite change or weight loss: No  History of prior or existing cancer: Yes           Physical Exam:     EXAMINATION pertinent findings:   PSYCH: Pleasant, healthy-appearing, alert, oriented x3, cooperative. Normal mood and affect.  VITAL SIGNS: There were no vitals taken for this visit..  Reviewed nursing intake notes.   There is no height or weight on file to calculate BMI.  RESP: non labored breathing   ABD: benign, soft, non-tender, no acute peritoneal findings  SKIN: grossly normal   LYMPHATIC: grossly normal, no adenopathy, no extremity edema  NEURO: grossly normal , no motor deficits  VASCULAR: satisfactory perfusion of all extremities   MUSCULOSKELETAL:   Left lower extremity:   Well healed scar over medial knee.  Slight fullness over the pes.  No knee effusion.  Knee range of motion 0-140 degrees.  Stable to varus and valgus stress.  Lachman Ia.  Negative posterior drawer.  Positive Wesley with medial pain.  Positive Thessaly with medial pain.  Negative squat test.  Thigh circumference was measured at 48 cm and the same as the contralateral side.                   Data:   All laboratory data reviewed  All imaging studies reviewed by me  Mild medial joint space narrowing on the left without any significant osteophytes             DATA for DOCUMENTATION:         Past Medical History:     Patient Active Problem List   Diagnosis    Major depressive disorder, single episode, mild (H24)    Endometrial polyp    Right knee pain, unspecified chronicity    Hip pain, right    History of basal cell carcinoma    History of dysplastic nevus    Left knee pain, unspecified chronicity     Concussion     Past Medical History:   Diagnosis Date    Cancer (H)     melanoma    Concussion     Endometrial polyp     Hx of skin cancer, basal cell 2015    Other and unspecified hyperlipidemia     PONV (postoperative nausea and vomiting)     In the past.       Also see scanned health assessment forms.       Past Surgical History:     Past Surgical History:   Procedure Laterality Date    BIOPSY OF SKIN LESION      BIOPSY SYNOVIUM KNEE Left 6/6/2018    Procedure: BIOPSY SYNOVIUM KNEE;;  Surgeon: Leo Bar MD;  Location: [facility]    C PELVIS/HIP JOINT SURGERY UNLISTED  6/2012    hamstring tear /reattachment    COLONOSCOPY N/A 10/15/2019    Procedure: COLONOSCOPY, WITH POLYPECTOMY AND BIOPSY;  Surgeon: Chelsie Mckeon MD;  Location:  GI    DILATION AND CURETTAGE, HYSTEROSCOPY DIAGNOSTIC, COMBINED  8/13/2014    Procedure: COMBINED DILATION AND CURETTAGE, HYSTEROSCOPY DIAGNOSTIC;  Surgeon: Liliana Luciano MD;  Location:  [facility]    EXCISE MASS LOWER EXTREMITY Left 6/6/2018    Procedure: EXCISE MASS LOWER EXTREMITY;  Excision Biopsy Of Left Knee, Synovial Biopsy Left Knee;  Surgeon: Leo Bar MD;  Location: [facility]    REPAIR TENDON HAMSTRING              Social History:     Social History     Socioeconomic History    Marital status:      Spouse name: Not on file    Number of children: Not on file    Years of education: Not on file    Highest education level: Not on file   Occupational History    Not on file   Tobacco Use    Smoking status: Never    Smokeless tobacco: Never   Substance and Sexual Activity    Alcohol use: Yes     Comment: on average 1 glass of wine or 1 beer a week    Drug use: No    Sexual activity: Yes     Partners: Male     Birth control/protection: Post-menopausal   Other Topics Concern    Parent/sibling w/ CABG, MI or angioplasty before 65F 55M? Not Asked   Social History Narrative    Not on file     Social Determinants of Health     Financial Resource Strain: Not on  file   Food Insecurity: Not on file   Transportation Needs: Not on file   Physical Activity: Not on file   Stress: Not on file   Social Connections: Not on file   Interpersonal Safety: Not on file   Housing Stability: Not on file            Family History:       Family History   Problem Relation Age of Onset    Hypertension Father     Arthritis Father     Alcoholism Father     Diabetes Paternal Grandmother     Arthritis Paternal Grandmother     Heart Disease Paternal Grandmother     Lipids Paternal Grandmother     Coronary Artery Disease Paternal Grandmother     Breast Cancer Mother     Alcohol/Drug Mother     Anesthesia Reaction Mother     Neurologic Disorder Mother     Lipids Mother     Osteoporosis Mother     Migraines Mother     Alcoholism Mother     Arthritis Maternal Grandmother     Eye Disorder Maternal Grandmother     Heart Disease Maternal Grandmother     Osteoporosis Maternal Grandmother     Depression Sister     Depression Maternal Grandfather     Mental Illness Maternal Grandfather     Heart Disease Paternal Grandfather     Psychotic Disorder Paternal Grandfather     Coronary Artery Disease Paternal Grandfather     Other Cancer Paternal Grandfather         lung    Psychotic Disorder Sister     Cancer Other         lung cancer - paternal grandfather (smoker)    Mental Illness Sister             Medications:     Current Outpatient Medications   Medication Sig    cholecalciferol (VITAMIN D3) 1000 UNIT tablet daily     Docosahexaenoic Acid (ALGAL OMEGA-3 DHA) 200 MG capsule daily     Glucosamine Sulfate 750 MG TABS daily     MELATONIN PO Take 1 mg by mouth    Methylcobalamin (METHYL B-12) 1000 MCG LOZG daily     UNABLE TO FIND Calm Magnesium- 2 teaspoons daily     No current facility-administered medications for this visit.              Review of Systems:   A comprehensive 10 point review of systems (constitutional, ENT, cardiac, peripheral vascular, lymphatic, respiratory, GI, , Musculoskeletal, skin,  Neurological) was performed and found to be negative except as described in this note.     See intake form completed by patient    Total combined visit time and work time before and after clinic visit on encounter date = 30 min          Leo Bar MD

## 2024-03-18 NOTE — LETTER
"    3/18/2024         RE: Christie Dick  [id number] [address] [facility] [id number]        Dear Colleague,    Thank you for referring your patient, Christie Dick, to the [facility]TH[facility]. Please see a copy of my visit note below.        [facility], Orthopaedic Oncology Surgery Consultation  by Leo Bar M.D.    Christie Dick MRN# [id number]    YOB: 1965     Requesting physician: No ref. provider found  Liliana Luciano   DX:  Schwannoma L medial knee soft tissue  R/o RA. Medial meniscal degeneration L knee        SURGERY:  6/6/18, Excision of medial knee schwannoma, L knee synovial biopsy ([place]) [facility]        Copath Report 06/06/2018  8:05 AM 88       Patient Name: CHRISTIE DICK   MR#: [id number]   Specimen #: [id number]   Collected: 6/6/2018   Received: 6/6/2018   Reported: 6/8/2018 17:44   Ordering Phy(s): LEO BAR     For improved result formatting, select 'View Enhanced Report Format' under    Linked Documents section.     ORIGINAL REPORT:     SPECIMEN(S):   A: Medial left knee   B: Left knee synovium biopsy     FINAL DIAGNOSIS:   A. Soft tissue, medial left knee, excision:   - Schwannoma     B. Left knee synovium biopsy:   - Synovial lipomatosis     I have personally reviewed all specimens and/or slides, including the   listed special stains, and used them   with my medical judgement to determine or confirm the final diagnosis.     Electronically signed out by:     Brielle Snow M.D., [facility]     CLINICAL HISTORY:   53-year-old female with a painful ovoid soft tissue mass measuring 2.6 x   1.8 x 1.0 cm identified beneath the   pedis tendons and the posterior lateral aspect of her medial femoral   condyle of the left knee.  Procedure:   Excisional biopsy and synovial biopsy.     GROSS:   A:  The specimen is received fresh with proper patient identification,   labeled \"left medial knee\".  The   specimen consists of a " "yellow-tan soft tissue measuring 2.6 x 1.5 x 1.2   cm.  The specimen is serially   sectioned and a representative section is submitted for frozen section and    resubmitted for permanent section   in cassette A1FS.  The remainder of the specimen is submitted entirely in   cassettes A2-A3. (Dictated by:   Kim Lazaro 6/6/2018 01:23 PM)     B:  The specimen is received in formalin with proper patient   identification, labeled \"left knee synovium   biopsy\".  The specimen consists of multiple tan-yellow soft tissues   measuring 1.9 x 1.5 x 0.5 cm in aggregate.    The specimen is submitted entirely in cassette B1. (Dictated by: Kim Lazaro 6/7/2018 10:05 AM)     INTRAOPERATIVE CONSULTATION:   Frozen section is performed on part A. Please refer to Epic Result History    for the Preliminary Intraoperative   Diagnosis.     MICROSCOPIC:   Microscopic examination is performed.     CPT Codes:   A: 04051-WX6, 59570-YS   B: 74383-DN2     TESTING LAB LOCATION:   [address] [address] [phone]     COLLECTION SITE:   Client: [facility]   Location: [facility] (B)     Resident   [email or web address]               Assessment and Plan:   Assessment:  48-year-old female with history of left medial knee soft tissue schwannoma here with left knee pain.      Plan:  Recent x-rays performed in November 2023 were reviewed.  We discussed that the etiology of her knee pain is unclear.  Her exam and history is suggestive of osteoarthritis or another intra-articular pathology such as a meniscus tear.  Given this, in addition to her history of schwannoma, we recommended a new MRI of the left knee.  She wished to proceed with this.  We will have her follow-up virtually to discuss the results. If the MRI demonstrates any surgical pathologies, we will arrange for in personal follow up.  We did have an extensive discussion " regarding physical therapy and exercises she could do with her  to optimize her conditioning and strength in her legs.  We discussed both eccentric and isometric exercises.  We also discussed low impact activities and swimming.  She prefers to do some kind of impact activities to maintain her bone health and we discussed jumping rope as a way of optimizing this.    The patient was seen discussed with Dr. Bar who agrees with the above assessment and plan.     Nidia Peacock MD, PGY4  Orthopedic Surgery     Attending MD (Dr. Leo Bar) Attestation :  This patient was seen and evaluated by me including a history, exam, and interpretation of all imaging and/or lab data.  I formulated the treatment plan along with either a physician's assistant (PA-C) or training physician (resident/fellow), who also saw the patient. That individual or a scribe has documented the visit in the attached note which I approve.    Leo Bar MD [facility] Family Professor  Oncology and Adult Reconstructive Surgery  Dept Orthopaedic Surgery, [facility]  [phone] office, [phone] pager  [email or web address]               History of Present Illness:   58 year old female  chief complaint: Left knee pain.    The patient reports an onset of left knee pain this past fall.  She denies any preceding trauma.  She describes her pain as pressure and locates it to the medial and posterior knee.  She says the knee feels full and it feels like she has cramps in her calf.  Pain worsens with cycling and going downstairs.  She notes associated intermittent swelling.  Denies any new numbness or tingling.  She notes she has persistent numbness over the anterior medial leg after her previous surgery.  She states she saw a provider at [facility] who obtained x-rays in November.  She was told she had mild arthritis and could consider corticosteroid joint.  She has tried activity modification with some relief.  Her symptoms have been  progressing and she is not able to exercise and is now taking Tylenol PM every night in order to sleep.  She has concerned that her schwannoma has returned or she has some new intra-articular pathology.  She notes she did have a left knee medial meniscus tear that did not require surgery in the past.    Current symptoms:  Problem: Left knee pain and Decreased mobility   Onset and duration: few months ago   Awakens from sleep due to sx's:  Yes  Precipitating Injury:  No    Other joints or sites painful:  No  Fever: No  Appetite change or weight loss: No  History of prior or existing cancer: Yes           Physical Exam:     EXAMINATION pertinent findings:   PSYCH: Pleasant, healthy-appearing, alert, oriented x3, cooperative. Normal mood and affect.  VITAL SIGNS: There were no vitals taken for this visit..  Reviewed nursing intake notes.   There is no height or weight on file to calculate BMI.  RESP: non labored breathing   ABD: benign, soft, non-tender, no acute peritoneal findings  SKIN: grossly normal   LYMPHATIC: grossly normal, no adenopathy, no extremity edema  NEURO: grossly normal , no motor deficits  VASCULAR: satisfactory perfusion of all extremities   MUSCULOSKELETAL:   Left lower extremity:   Well healed scar over medial knee.  Slight fullness over the pes.  No knee effusion.  Knee range of motion 0-140 degrees.  Stable to varus and valgus stress.  Lachman Ia.  Negative posterior drawer.  Positive Wesley with medial pain.  Positive Thessaly with medial pain.  Negative squat test.  Thigh circumference was measured at 48 cm and the same as the contralateral side.                   Data:   All laboratory data reviewed  All imaging studies reviewed by me  Mild medial joint space narrowing on the left without any significant osteophytes             DATA for DOCUMENTATION:         Past Medical History:     Patient Active Problem List   Diagnosis    Major depressive disorder, single episode, mild (H24)     Endometrial polyp    Right knee pain, unspecified chronicity    Hip pain, right    History of basal cell carcinoma    History of dysplastic nevus    Left knee pain, unspecified chronicity    Concussion     Past Medical History:   Diagnosis Date    Cancer (H)     melanoma    Concussion     Endometrial polyp     Hx of skin cancer, basal cell 2015    Other and unspecified hyperlipidemia     PONV (postoperative nausea and vomiting)     In the past.       Also see scanned health assessment forms.       Past Surgical History:     Past Surgical History:   Procedure Laterality Date    BIOPSY OF SKIN LESION      BIOPSY SYNOVIUM KNEE Left 6/6/2018    Procedure: BIOPSY SYNOVIUM KNEE;;  Surgeon: Leo Bar MD;  Location: [facility]    C PELVIS/HIP JOINT SURGERY UNLISTED  6/2012    hamstring tear /reattachment    COLONOSCOPY N/A 10/15/2019    Procedure: COLONOSCOPY, WITH POLYPECTOMY AND BIOPSY;  Surgeon: Chelsie Mckeon MD;  Location:  GI    DILATION AND CURETTAGE, HYSTEROSCOPY DIAGNOSTIC, COMBINED  8/13/2014    Procedure: COMBINED DILATION AND CURETTAGE, HYSTEROSCOPY DIAGNOSTIC;  Surgeon: Liliana Luciano MD;  Location:  [facility]    EXCISE MASS LOWER EXTREMITY Left 6/6/2018    Procedure: EXCISE MASS LOWER EXTREMITY;  Excision Biopsy Of Left Knee, Synovial Biopsy Left Knee;  Surgeon: Leo Bra MD;  Location: [facility] OR    REPAIR TENDON HAMSTRING              Social History:     Social History     Socioeconomic History    Marital status:      Spouse name: Not on file    Number of children: Not on file    Years of education: Not on file    Highest education level: Not on file   Occupational History    Not on file   Tobacco Use    Smoking status: Never    Smokeless tobacco: Never   Substance and Sexual Activity    Alcohol use: Yes     Comment: on average 1 glass of wine or 1 beer a week    Drug use: No    Sexual activity: Yes     Partners: Male     Birth control/protection: Post-menopausal   Other Topics  Concern    Parent/sibling w/ CABG, MI or angioplasty before 65F 55M? Not Asked   Social History Narrative    Not on file     Social Determinants of Health     Financial Resource Strain: Not on file   Food Insecurity: Not on file   Transportation Needs: Not on file   Physical Activity: Not on file   Stress: Not on file   Social Connections: Not on file   Interpersonal Safety: Not on file   Housing Stability: Not on file            Family History:       Family History   Problem Relation Age of Onset    Hypertension Father     Arthritis Father     Alcoholism Father     Diabetes Paternal Grandmother     Arthritis Paternal Grandmother     Heart Disease Paternal Grandmother     Lipids Paternal Grandmother     Coronary Artery Disease Paternal Grandmother     Breast Cancer Mother     Alcohol/Drug Mother     Anesthesia Reaction Mother     Neurologic Disorder Mother     Lipids Mother     Osteoporosis Mother     Migraines Mother     Alcoholism Mother     Arthritis Maternal Grandmother     Eye Disorder Maternal Grandmother     Heart Disease Maternal Grandmother     Osteoporosis Maternal Grandmother     Depression Sister     Depression Maternal Grandfather     Mental Illness Maternal Grandfather     Heart Disease Paternal Grandfather     Psychotic Disorder Paternal Grandfather     Coronary Artery Disease Paternal Grandfather     Other Cancer Paternal Grandfather         lung    Psychotic Disorder Sister     Cancer Other         lung cancer - paternal grandfather (smoker)    Mental Illness Sister             Medications:     Current Outpatient Medications   Medication Sig    cholecalciferol (VITAMIN D3) 1000 UNIT tablet daily     Docosahexaenoic Acid (ALGAL OMEGA-3 DHA) 200 MG capsule daily     Glucosamine Sulfate 750 MG TABS daily     MELATONIN PO Take 1 mg by mouth    Methylcobalamin (METHYL B-12) 1000 MCG LOZG daily     UNABLE TO FIND Calm Magnesium- 2 teaspoons daily     No current facility-administered medications for this  visit.              Review of Systems:   A comprehensive 10 point review of systems (constitutional, ENT, cardiac, peripheral vascular, lymphatic, respiratory, GI, , Musculoskeletal, skin, Neurological) was performed and found to be negative except as described in this note.     See intake form completed by patient    Total combined visit time and work time before and after clinic visit on encounter date = 30 min

## 2024-03-18 NOTE — LETTER
"    3/18/2024         RE: Christie Dick  [address] [address] [address]        Dear Colleague,    Thank you for referring your patient, Christie Dick, to the [facility] ORTHOPEDIC [facility]. Please see a copy of my visit note below.        [facility], Orthopaedic Oncology Surgery Consultation  by Leo Bar M.D.    Christie Dick MRN# [id number]    YOB: 1965     Requesting physician: No ref. provider found  Liliana Luciano   DX:  Schwannoma L medial knee soft tissue  R/o RA. Medial meniscal degeneration L knee        SURGERY:  6/6/18, Excision of medial knee schwannoma, L knee synovial biopsy (Yosvany) [facility]        Copath Report 06/06/2018  8:05 AM 88       Patient Name: CHRISTIE DICK   MR#: [id number]   Specimen #: [id number]   Collected: 6/6/2018   Received: 6/6/2018   Reported: 6/8/2018 17:44   Ordering Phy(s): LEO BAR     For improved result formatting, select 'View Enhanced Report Format' under    Linked Documents section.     ORIGINAL REPORT:     SPECIMEN(S):   A: Medial left knee   B: Left knee synovium biopsy     FINAL DIAGNOSIS:   A. Soft tissue, medial left knee, excision:   - Schwannoma     B. Left knee synovium biopsy:   - Synovial lipomatosis     I have personally reviewed all specimens and/or slides, including the   listed special stains, and used them   with my medical judgement to determine or confirm the final diagnosis.     Electronically signed out by:     Brielle Snow M.D., [facility]     CLINICAL HISTORY:   53-year-old female with a painful ovoid soft tissue mass measuring 2.6 x   1.8 x 1.0 cm identified beneath the   pedis tendons and the posterior lateral aspect of her medial femoral   condyle of the left knee.  Procedure:   Excisional biopsy and synovial biopsy.     GROSS:   A:  The specimen is received fresh with proper patient identification,   labeled \"left medial knee\".  The   specimen consists of a " "yellow-tan soft tissue measuring 2.6 x 1.5 x 1.2   cm.  The specimen is serially   sectioned and a representative section is submitted for frozen section and    resubmitted for permanent section   in cassette A1FS.  The remainder of the specimen is submitted entirely in   cassettes A2-A3. (Dictated by:   Kim Lazaro 6/6/2018 01:23 PM)     B:  The specimen is received in formalin with proper patient   identification, labeled \"left knee synovium   biopsy\".  The specimen consists of multiple tan-yellow soft tissues   measuring 1.9 x 1.5 x 0.5 cm in aggregate.    The specimen is submitted entirely in cassette B1. (Dictated by: Kim Lazaro 6/7/2018 10:05 AM)     INTRAOPERATIVE CONSULTATION:   Frozen section is performed on part A. Please refer to Epic Result History    for the Preliminary Intraoperative   Diagnosis.     MICROSCOPIC:   Microscopic examination is performed.     CPT Codes:   A: 80640-XU9, 52625-TR   B: 21871-VD1     TESTING LAB LOCATION:   [address] [address] [phone]     COLLECTION SITE:   Client: [facility]   Location: [facility] (B)     Resident   [email or web address]               Assessment and Plan:   Assessment:  48-year-old female with history of left medial knee soft tissue schwannoma here with left knee pain.      Plan:  Recent x-rays performed in November 2023 were reviewed.  We discussed that the etiology of her knee pain is unclear.  Her exam and history is suggestive of osteoarthritis or another intra-articular pathology such as a meniscus tear.  Given this, in addition to her history of schwannoma, we recommended a new MRI of the left knee.  She wished to proceed with this.  We will have her follow-up virtually to discuss the results. If the MRI demonstrates any surgical pathologies, we will arrange for in personal follow up.  We did have an extensive discussion " regarding physical therapy and exercises she could do with her  to optimize her conditioning and strength in her legs.  We discussed both eccentric and isometric exercises.  We also discussed low impact activities and swimming.  She prefers to do some kind of impact activities to maintain her bone health and we discussed jumping rope as a way of optimizing this.    The patient was seen discussed with Dr. Bar who agrees with the above assessment and plan.     Nidia Peacock MD, PGY4  Orthopedic Surgery     Attending MD (Dr. Leo Bar) Attestation :  This patient was seen and evaluated by me including a history, exam, and interpretation of all imaging and/or lab data.  I formulated the treatment plan along with either a physician's assistant (PA-C) or training physician (resident/fellow), who also saw the patient. That individual or a scribe has documented the visit in the attached note which I approve.    Leo Bar MD [facility] Family [facility]  Oncology and Adult Reconstructive Surgery  Dept Orthopaedic Surgery, [facility]  [phone] office, [phone] pager  [email or web address]               History of Present Illness:   58 year old female  chief complaint: Left knee pain.    The patient reports an onset of left knee pain this past fall.  She denies any preceding trauma.  She describes her pain as pressure and locates it to the medial and posterior knee.  She says the knee feels full and it feels like she has cramps in her calf.  Pain worsens with cycling and going downstairs.  She notes associated intermittent swelling.  Denies any new numbness or tingling.  She notes she has persistent numbness over the anterior medial leg after her previous surgery.  She states she saw a provider at [facility] who obtained x-rays in November.  She was told she had mild arthritis and could consider corticosteroid joint.  She has tried activity modification with some relief.  Her symptoms have been  progressing and she is not able to exercise and is now taking Tylenol PM every night in order to sleep.  She has concerned that her schwannoma has returned or she has some new intra-articular pathology.  She notes she did have a left knee medial meniscus tear that did not require surgery in the past.    Current symptoms:  Problem: Left knee pain and Decreased mobility   Onset and duration: few months ago   Awakens from sleep due to sx's:  Yes  Precipitating Injury:  No    Other joints or sites painful:  No  Fever: No  Appetite change or weight loss: No  History of prior or existing cancer: Yes           Physical Exam:     EXAMINATION pertinent findings:   PSYCH: Pleasant, healthy-appearing, alert, oriented x3, cooperative. Normal mood and affect.  VITAL SIGNS: There were no vitals taken for this visit..  Reviewed nursing intake notes.   There is no height or weight on file to calculate BMI.  RESP: non labored breathing   ABD: benign, soft, non-tender, no acute peritoneal findings  SKIN: grossly normal   LYMPHATIC: grossly normal, no adenopathy, no extremity edema  NEURO: grossly normal , no motor deficits  VASCULAR: satisfactory perfusion of all extremities   MUSCULOSKELETAL:   Left lower extremity:   Well healed scar over medial knee.  Slight fullness over the pes.  No knee effusion.  Knee range of motion 0-140 degrees.  Stable to varus and valgus stress.  Lachman Ia.  Negative posterior drawer.  Positive Wesley with medial pain.  Positive Thessaly with medial pain.  Negative squat test.  Thigh circumference was measured at 48 cm and the same as the contralateral side.                   Data:   All laboratory data reviewed  All imaging studies reviewed by me  Mild medial joint space narrowing on the left without any significant osteophytes             DATA for DOCUMENTATION:         Past Medical History:     Patient Active Problem List   Diagnosis     Major depressive disorder, single episode, mild (H24)      Endometrial polyp     Right knee pain, unspecified chronicity     Hip pain, right     History of basal cell carcinoma     History of dysplastic nevus     Left knee pain, unspecified chronicity     Concussion     Past Medical History:   Diagnosis Date     Cancer (H)     melanoma     Concussion      Endometrial polyp      Hx of skin cancer, basal cell 2015     Other and unspecified hyperlipidemia      PONV (postoperative nausea and vomiting)     In the past.       Also see scanned health assessment forms.       Past Surgical History:     Past Surgical History:   Procedure Laterality Date     BIOPSY OF SKIN LESION       BIOPSY SYNOVIUM KNEE Left 6/6/2018    Procedure: BIOPSY SYNOVIUM KNEE;;  Surgeon: Leo Bar MD;  Location: [facility]     C PELVIS/HIP JOINT SURGERY UNLISTED  6/2012    hamstring tear /reattachment     COLONOSCOPY N/A 10/15/2019    Procedure: COLONOSCOPY, WITH POLYPECTOMY AND BIOPSY;  Surgeon: Chelsie Mckeon MD;  Location:  GI     DILATION AND CURETTAGE, HYSTEROSCOPY DIAGNOSTIC, COMBINED  8/13/2014    Procedure: COMBINED DILATION AND CURETTAGE, HYSTEROSCOPY DIAGNOSTIC;  Surgeon: Liliana Luciano MD;  Location:  SD     EXCISE MASS LOWER EXTREMITY Left 6/6/2018    Procedure: EXCISE MASS LOWER EXTREMITY;  Excision Biopsy Of Left Knee, Synovial Biopsy Left Knee;  Surgeon: Leo Bar MD;  Location: [facility] OR     REPAIR TENDON HAMSTRING              Social History:     Social History     Socioeconomic History     Marital status:      Spouse name: Not on file     Number of children: Not on file     Years of education: Not on file     Highest education level: Not on file   Occupational History     Not on file   Tobacco Use     Smoking status: Never     Smokeless tobacco: Never   Substance and Sexual Activity     Alcohol use: Yes     Comment: on average 1 glass of wine or 1 beer a week     Drug use: No     Sexual activity: Yes     Partners: Male     Birth control/protection:  Post-menopausal   Other Topics Concern     Parent/sibling w/ CABG, MI or angioplasty before 65F 55M? Not Asked   Social History Narrative     Not on file     Social Determinants of Health     Financial Resource Strain: Not on file   Food Insecurity: Not on file   Transportation Needs: Not on file   Physical Activity: Not on file   Stress: Not on file   Social Connections: Not on file   Interpersonal Safety: Not on file   Housing Stability: Not on file            Family History:       Family History   Problem Relation Age of Onset     Hypertension Father      Arthritis Father      Alcoholism Father      Diabetes Paternal Grandmother      Arthritis Paternal Grandmother      Heart Disease Paternal Grandmother      Lipids Paternal Grandmother      Coronary Artery Disease Paternal Grandmother      Breast Cancer Mother      Alcohol/Drug Mother      Anesthesia Reaction Mother      Neurologic Disorder Mother      Lipids Mother      Osteoporosis Mother      Migraines Mother      Alcoholism Mother      Arthritis Maternal Grandmother      Eye Disorder Maternal Grandmother      Heart Disease Maternal Grandmother      Osteoporosis Maternal Grandmother      Depression Sister      Depression Maternal Grandfather      Mental Illness Maternal Grandfather      Heart Disease Paternal Grandfather      Psychotic Disorder Paternal Grandfather      Coronary Artery Disease Paternal Grandfather      Other Cancer Paternal Grandfather         lung     Psychotic Disorder Sister      Cancer Other         lung cancer - paternal grandfather (smoker)     Mental Illness Sister             Medications:     Current Outpatient Medications   Medication Sig     cholecalciferol (VITAMIN D3) 1000 UNIT tablet daily      Docosahexaenoic Acid (ALGAL OMEGA-3 DHA) 200 MG capsule daily      Glucosamine Sulfate 750 MG TABS daily      MELATONIN PO Take 1 mg by mouth     Methylcobalamin (METHYL B-12) 1000 MCG LOZG daily      UNABLE TO FIND Calm Magnesium- 2  teaspoons daily     No current facility-administered medications for this visit.              Review of Systems:   A comprehensive 10 point review of systems (constitutional, ENT, cardiac, peripheral vascular, lymphatic, respiratory, GI, , Musculoskeletal, skin, Neurological) was performed and found to be negative except as described in this note.     See intake form completed by patient    Total combined visit time and work time before and after clinic visit on encounter date = 30 min        Again, thank you for allowing me to participate in the care of your patient.        Sincerely,        Leo Bar MD

## 2024-04-01 ENCOUNTER — ANCILLARY PROCEDURE (OUTPATIENT)
Dept: MRI IMAGING | Facility: CLINIC | Age: 59
End: 2024-04-01
Attending: ORTHOPAEDIC SURGERY
Payer: COMMERCIAL

## 2024-04-01 DIAGNOSIS — M25.562 LEFT KNEE PAIN, UNSPECIFIED CHRONICITY: ICD-10-CM

## 2024-04-01 PROCEDURE — 73721 MRI JNT OF LWR EXTRE W/O DYE: CPT | Mod: 26 | Performed by: RADIOLOGY

## 2024-04-01 PROCEDURE — 73721 MRI JNT OF LWR EXTRE W/O DYE: CPT | Mod: LT

## 2024-04-09 NOTE — PROGRESS NOTES
[facility], Orthopaedic Oncology Surgery Consultation  by Leo Bar M.D.    Nori Lazaro MRN# [id number]    YOB: 1965     Requesting physician: No ref. provider found  Liliana Luciano   DX:  Schwannoma L medial knee soft tissue  R/o RA. Medial meniscal degeneration L knee w prox tib subchondral cyst       SURGERY:  6/6/18, Excision of medial knee schwannoma, L knee synovial biopsy (Yosvany) [facility]     I met with Luiza Lazaro to review his situation and the MRI study and compared to the previous study of 2018.  She has evidence of degeneration of the medial meniscus and it has progressed slightly compared to the study 6 years ago.  Furthermore she has a subchondral cyst that is also progressed slightly in size compared to 6 years ago.    We had a general discussion regarding optimizing the health of her knee as she undergoes gradual degradation from mild to moderately severe degenerative disease of the knee.    Impression and plan:  Degenerative joint disease of left knee, Kellgren-Denver grade 1    She would like to stay active in her hiking and travels as well as athletic activity.  She has already incorporated cycling and swimming.  I emphasized that focusing on quadricep strengthening has been shown to be beneficial in for stalling symptoms related to osteoarthritis.  She inquired about physical therapy and I suggested that a sports oriented physical therapist might be helpful to her.  I suggested [facility] for her needs given his proximity to her home and their focus.  I will ask our nurse coordinator Nel to forward a printed therapy prescription for left knee quadriceps strengthening and stabilization, proprioceptive training, to reduce synovitis and stabilize knee for medial meniscal degeneration.    We also discussed the possibility of a steroid injection into the left knee.  I advised her to await status of her knee and see how she does after  physiotherapy for several months before considering the steroid injection into the left knee joint.  If she wishes to proceed with*injection she will contact us and schedule a visit.    Leo Bar MD  [facility] Family Professor  Oncology and Adult Reconstructive Surgery  Dept Orthopaedic Surgery, [facility]  [phone] office, [phone] pager  [email or web address]                        Virtual-Visit Details    Type of service:  Video Visit  Visit total duration (including visit time, pre and post visit work time as documented above on the same day of service): 20  min  Video start time: 1705  Video end time:  1725  Originating Location (pt. Location): Home  Distant Location (provider location):  [facility] ORTHOPEDIC [facility]   Platform used for Virtual Visit: [employer]

## 2024-04-11 ENCOUNTER — VIRTUAL VISIT (OUTPATIENT)
Dept: ORTHOPEDICS | Facility: CLINIC | Age: 59
End: 2024-04-11
Payer: COMMERCIAL

## 2024-04-11 VITALS — BODY MASS INDEX: 26.98 KG/M2 | HEIGHT: 64 IN | WEIGHT: 158 LBS

## 2024-04-11 DIAGNOSIS — M17.12 PRIMARY OSTEOARTHRITIS OF LEFT KNEE: Primary | ICD-10-CM

## 2024-04-11 PROCEDURE — 99213 OFFICE O/P EST LOW 20 MIN: CPT | Mod: 95 | Performed by: ORTHOPAEDIC SURGERY

## 2024-04-11 ASSESSMENT — PAIN SCALES - GENERAL: PAINLEVEL: MILD PAIN (2)

## 2024-04-11 NOTE — LETTER
4/11/2024         RE: Nori Lazaro  [address]  [place] [address]        Dear Colleague,    Thank you for referring your patient, Nori Lazaro, to the [facility] ORTHOPEDIC CLINIC [place]. Please see a copy of my visit note below.            [facility], Orthopaedic Oncology Surgery Consultation  by Leo Bar M.D.    Nori Lazaro MRN# [id number]    YOB: 1965     Requesting physician: No ref. provider found  Liliana Luciano   DX:  Schwannoma L medial knee soft tissue  R/o RA. Medial meniscal degeneration L knee w prox tib subchondral cyst       SURGERY:  6/6/18, Excision of medial knee schwannoma, L knee synovial biopsy (Yosvany) [facility]     I met with Luiza Lazaro to review his situation and the MRI study and compared to the previous study of 2018.  She has evidence of degeneration of the medial meniscus and it has progressed slightly compared to the study 6 years ago.  Furthermore she has a subchondral cyst that is also progressed slightly in size compared to 6 years ago.    We had a general discussion regarding optimizing the health of her knee as she undergoes gradual degradation from mild to moderately severe degenerative disease of the knee.    Impression and plan:  Degenerative joint disease of left knee, Kellgren-Denver grade 1    She would like to stay active in her hiking and travels as well as athletic activity.  She has already incorporated cycling and swimming.  I emphasized that focusing on quadricep strengthening has been shown to be beneficial in for stalling symptoms related to osteoarthritis.  She inquired about physical therapy and I suggested that a sports oriented physical therapist might be helpful to her.  I suggested [facility] for her needs given his proximity to her home and their focus.  I will ask our nurse coordinator Nel to forward a printed therapy prescription for left knee quadriceps strengthening and  stabilization, proprioceptive training, to reduce synovitis and stabilize knee for medial meniscal degeneration.    We also discussed the possibility of a steroid injection into the left knee.  I advised her to await status of her knee and see how she does after physiotherapy for several months before considering the steroid injection into the left knee joint.  If she wishes to proceed with*injection she will contact us and schedule a visit.    Leo Bar MD [facility] Family Professor  Oncology and Adult Reconstructive Surgery  Dept Orthopaedic Surgery, [facility]  [phone] office, [phone] pager  [email or web address]                        Virtual-Visit Details    Type of service:  Video Visit  Visit total duration (including visit time, pre and post visit work time as documented above on the same day of service): 20  min  Video start time: 1705  Video end time:  1725  Originating Location (pt. Location): Home  Distant Location (provider location):  [facility] ORTHOPEDIC [facility]   Platform used for Virtual Visit: [employer]

## 2024-06-01 ENCOUNTER — HEALTH MAINTENANCE LETTER (OUTPATIENT)
Age: 59
End: 2024-06-01

## 2024-07-05 ENCOUNTER — HOSPITAL ENCOUNTER (OUTPATIENT)
Dept: BONE DENSITY | Facility: CLINIC | Age: 59
Discharge: HOME | End: 2024-07-05
Attending: SPECIALIST | Admitting: SPECIALIST
Payer: COMMERCIAL

## 2024-07-05 DIAGNOSIS — M81.0 OSTEOPOROSIS: ICD-10-CM

## 2024-07-05 PROCEDURE — 77080 DXA BONE DENSITY AXIAL: CPT

## 2024-11-22 ENCOUNTER — HOSPITAL ENCOUNTER (OUTPATIENT)
Dept: MAMMOGRAPHY | Facility: CLINIC | Age: 59
Discharge: HOME | End: 2024-11-22
Attending: OBSTETRICS & GYNECOLOGY | Admitting: OBSTETRICS & GYNECOLOGY
Payer: COMMERCIAL

## 2024-11-22 DIAGNOSIS — Z12.31 VISIT FOR SCREENING MAMMOGRAM: ICD-10-CM

## 2024-11-22 PROCEDURE — 77067 SCR MAMMO BI INCL CAD: CPT

## 2024-11-22 PROCEDURE — 77063 BREAST TOMOSYNTHESIS BI: CPT

## 2025-01-17 NOTE — TELEPHONE ENCOUNTER
Detail Level: Simple I called Nori after I received the MRI and images of her knees and her hip.  I also obtain the reports from [facility].    The only area of concern is the left knee where a soft tissue mass is identified beneath the pedis tendons and the posterior lateral aspect of her medial femoral condyle.  The other findings noted in the left knee pertain mainly due to early degenerative disease.  The soft tissue mass is located between her MCL attachment on the bone and the adjacent tendons.  I recommended an excisional biopsy be performed.  I explained the consequences of this procedure and the expected postoperative recovery.  I think we can defer her rheumatology evaluation until this is been completed.  She will make the appropriate arrangements and contact our office about proceeding with the procedure.    MD Hernesto Harrison Family Professor  Oncology and Adult Reconstructive Surgery  Dept Orthopaedic Surgery, [facility]  [phone] office, [phone] pager  [email or web address]     Detail Level: Generalized Prescription Strength Graduated Compression Stockings Recommendations: The patient was counseled that prescription strength graduated compression stockings should be worn for all waking hours. They will follow up with a venous specialist to monitor graduated compression stocking usage and their symptoms. Detail Level: Zone

## 2025-04-04 ENCOUNTER — ANCILLARY ORDERS (OUTPATIENT)
Dept: CARDIOLOGY | Facility: CLINIC | Age: 60
End: 2025-04-04
Payer: COMMERCIAL

## 2025-04-04 DIAGNOSIS — Z13.6 ENCOUNTER FOR SCREENING FOR CARDIOVASCULAR DISORDERS: Primary | ICD-10-CM

## 2025-04-09 ENCOUNTER — HOSPITAL ENCOUNTER (OUTPATIENT)
Dept: CARDIOLOGY | Facility: CLINIC | Age: 60
Discharge: HOME | End: 2025-04-09
Payer: COMMERCIAL

## 2025-04-09 DIAGNOSIS — Z13.6 ENCOUNTER FOR SCREENING FOR CARDIOVASCULAR DISORDERS: ICD-10-CM

## 2025-04-09 PROCEDURE — 75571 CT HRT W/O DYE W/CA TEST: CPT

## 2025-06-02 ENCOUNTER — LAB REQUISITION (OUTPATIENT)
Dept: LAB | Facility: CLINIC | Age: 60
End: 2025-06-02

## 2025-06-02 DIAGNOSIS — Z12.4 ENCOUNTER FOR SCREENING FOR MALIGNANT NEOPLASM OF CERVIX: ICD-10-CM

## 2025-06-02 PROCEDURE — 87624 HPV HI-RISK TYP POOLED RSLT: CPT | Performed by: OBSTETRICS & GYNECOLOGY

## 2025-06-02 PROCEDURE — G0145 SCR C/V CYTO,THINLAYER,RESCR: HCPCS | Performed by: OBSTETRICS & GYNECOLOGY

## 2025-06-03 LAB
HPV HR 12 DNA CVX QL NAA+PROBE: NEGATIVE
HPV16 DNA CVX QL NAA+PROBE: NEGATIVE
HPV18 DNA CVX QL NAA+PROBE: NEGATIVE
HUMAN PAPILLOMA VIRUS FINAL DIAGNOSIS: NORMAL

## 2025-06-05 LAB
BKR AP ASSOCIATED HPV REPORT: NORMAL
BKR LAB AP GYN ADEQUACY: NORMAL
BKR LAB AP GYN INTERPRETATION: NORMAL
BKR LAB AP LMP: NORMAL
BKR LAB AP PREVIOUS ABNL DX: NORMAL
BKR LAB AP PREVIOUS ABNORMAL: NORMAL
PATH REPORT.COMMENTS IMP SPEC: NORMAL
PATH REPORT.COMMENTS IMP SPEC: NORMAL
PATH REPORT.RELEVANT HX SPEC: NORMAL

## 2025-06-14 ENCOUNTER — HEALTH MAINTENANCE LETTER (OUTPATIENT)
Age: 60
End: 2025-06-14

## (undated) DEVICE — SU PDS II 3-0 PS-2 18" Z497G

## (undated) DEVICE — DRAPE IOBAN INCISE 23X17" 6650EZ

## (undated) DEVICE — SPECIMEN CONTAINER 5OZ STERILE 2600SA

## (undated) DEVICE — SUCTION MANIFOLD DORNOCH ULTRA CART UL-CL500

## (undated) DEVICE — PREP SKIN SCRUB TRAY 4461A

## (undated) DEVICE — SU SILK 2-0 SH 30" K833H

## (undated) DEVICE — SU VICRYL 2-0 CT-1 27" UND J259H

## (undated) DEVICE — GLOVE PROTEXIS POWDER FREE 8.0 ORTHOPEDIC 2D73ET80

## (undated) DEVICE — LINEN GOWN OVERSIZE 5408

## (undated) DEVICE — TOURNIQUET CUFF 30" REPRO BLUE 60-7070-105

## (undated) DEVICE — GLOVE PROTEXIS BLUE W/NEU-THERA 7.5  2D73EB75

## (undated) DEVICE — BLADE KNIFE SURG 11 371111

## (undated) DEVICE — DRSG STERI STRIP 1/2X4" R1547

## (undated) DEVICE — SU SILK 2-0 TIE 12X30" A305H

## (undated) DEVICE — DRAPE MAYO STAND 23X54 8337

## (undated) DEVICE — LINEN GOWN X4 5410

## (undated) DEVICE — DRSG PRIMAPORE 03 1/8X6" 66000318

## (undated) DEVICE — GLOVE PROTEXIS W/NEU-THERA 8.0  2D73TE80

## (undated) DEVICE — DRSG GAUZE 2X2" 8042

## (undated) DEVICE — ESU GROUND PAD UNIVERSAL W/O CORD

## (undated) DEVICE — CLIP HORIZON MED BLUE 002200

## (undated) DEVICE — LINEN BACK PACK 5440

## (undated) DEVICE — GLOVE PROTEXIS BLUE W/NEU-THERA 8.0  2D73EB80

## (undated) DEVICE — SU SILK 3-0 TIE 12X30" A304H

## (undated) DEVICE — GOWN IMPERVIOUS ZONED XLG 9041

## (undated) DEVICE — PREP POVIDONE IODINE SCRUB 7.5% 120ML

## (undated) DEVICE — BLADE KNIFE SURG 15 371115

## (undated) DEVICE — SOL NACL 0.9% IRRIG 1000ML BOTTLE 2F7124

## (undated) DEVICE — SOL WATER IRRIG 1000ML BOTTLE 2F7114

## (undated) DEVICE — PREP DURAPREP 26ML APL 8630

## (undated) DEVICE — GLOVE PROTEXIS W/NEU-THERA 7.0  2D73TE70

## (undated) DEVICE — PREP DURAPREP REMOVER 4OZ 8611

## (undated) DEVICE — LINEN ORTHO PACK 5446

## (undated) DEVICE — KIT CULTURE TRANSPORT SYS A.C.T. II DUAL ANEROBE R124022

## (undated) DEVICE — DRSG PRIMAPORE 02X3" 7133

## (undated) DEVICE — SPONGE RAY-TEC 4X8" 7318

## (undated) DEVICE — Device

## (undated) DEVICE — SU PDS II 3-0 PS-1 18" Z683G

## (undated) DEVICE — STRAP KNEE/BODY 31143004

## (undated) DEVICE — DRSG TELFA 3X8" 1238

## (undated) DEVICE — PACK LOWER EXTREMITY RIVERSIDE SOP32LEFSX

## (undated) DEVICE — DRAPE STOCKINETTE 8" 8586

## (undated) RX ORDER — PROPOFOL 10 MG/ML
INJECTION, EMULSION INTRAVENOUS
Status: DISPENSED
Start: 2018-06-06

## (undated) RX ORDER — HYDROMORPHONE HYDROCHLORIDE 1 MG/ML
INJECTION, SOLUTION INTRAMUSCULAR; INTRAVENOUS; SUBCUTANEOUS
Status: DISPENSED
Start: 2018-06-06

## (undated) RX ORDER — ONDANSETRON 2 MG/ML
INJECTION INTRAMUSCULAR; INTRAVENOUS
Status: DISPENSED
Start: 2018-06-06

## (undated) RX ORDER — CEFAZOLIN SODIUM 2 G/100ML
INJECTION, SOLUTION INTRAVENOUS
Status: DISPENSED
Start: 2018-06-06

## (undated) RX ORDER — FENTANYL CITRATE 50 UG/ML
INJECTION, SOLUTION INTRAMUSCULAR; INTRAVENOUS
Status: DISPENSED
Start: 2018-06-06

## (undated) RX ORDER — OXYCODONE AND ACETAMINOPHEN 5; 325 MG/1; MG/1
TABLET ORAL
Status: DISPENSED
Start: 2018-06-06

## (undated) RX ORDER — LIDOCAINE HYDROCHLORIDE 20 MG/ML
INJECTION, SOLUTION EPIDURAL; INFILTRATION; INTRACAUDAL; PERINEURAL
Status: DISPENSED
Start: 2018-06-06

## (undated) RX ORDER — DEXAMETHASONE SODIUM PHOSPHATE 4 MG/ML
INJECTION, SOLUTION INTRA-ARTICULAR; INTRALESIONAL; INTRAMUSCULAR; INTRAVENOUS; SOFT TISSUE
Status: DISPENSED
Start: 2018-06-06

## (undated) RX ORDER — FENTANYL CITRATE 50 UG/ML
INJECTION, SOLUTION INTRAMUSCULAR; INTRAVENOUS
Status: DISPENSED
Start: 2019-10-15